# Patient Record
Sex: FEMALE | Race: WHITE | Employment: FULL TIME | URBAN - METROPOLITAN AREA
[De-identification: names, ages, dates, MRNs, and addresses within clinical notes are randomized per-mention and may not be internally consistent; named-entity substitution may affect disease eponyms.]

---

## 2017-01-03 ENCOUNTER — HOSPITAL ENCOUNTER (OUTPATIENT)
Age: 52
Discharge: HOME | End: 2017-01-03
Attending: EMERGENCY MEDICINE

## 2017-01-03 ENCOUNTER — APPOINTMENT (OUTPATIENT)
Dept: GENERAL RADIOLOGY | Age: 52
End: 2017-01-03
Attending: EMERGENCY MEDICINE

## 2017-01-03 VITALS
OXYGEN SATURATION: 100 % | RESPIRATION RATE: 16 BRPM | WEIGHT: 130 LBS | HEART RATE: 61 BPM | DIASTOLIC BLOOD PRESSURE: 65 MMHG | HEIGHT: 68 IN | TEMPERATURE: 98 F | BODY MASS INDEX: 19.7 KG/M2 | SYSTOLIC BLOOD PRESSURE: 115 MMHG

## 2017-01-03 DIAGNOSIS — M54.40 BACK PAIN OF LUMBAR REGION WITH SCIATICA: Primary | ICD-10-CM

## 2017-01-03 PROCEDURE — 99214 OFFICE O/P EST MOD 30 MIN: CPT

## 2017-01-03 PROCEDURE — 73502 X-RAY EXAM HIP UNI 2-3 VIEWS: CPT

## 2017-01-03 PROCEDURE — 72100 X-RAY EXAM L-S SPINE 2/3 VWS: CPT

## 2017-01-03 RX ORDER — LEVOTHYROXINE SODIUM 0.03 MG/1
25 TABLET ORAL
COMMUNITY
End: 2017-02-10 | Stop reason: DRUGHIGH

## 2017-01-03 RX ORDER — NAPROXEN 500 MG/1
500 TABLET ORAL 2 TIMES DAILY PRN
Qty: 14 TABLET | Refills: 0 | Status: SHIPPED | OUTPATIENT
Start: 2017-01-03 | End: 2017-01-10

## 2017-01-03 RX ORDER — METHYLPREDNISOLONE 4 MG/1
TABLET ORAL
Qty: 1 PACKAGE | Refills: 0 | Status: SHIPPED | OUTPATIENT
Start: 2017-01-03 | End: 2017-01-08

## 2017-01-03 RX ORDER — CYCLOBENZAPRINE HCL 10 MG
10 TABLET ORAL 3 TIMES DAILY PRN
Qty: 15 TABLET | Refills: 0 | Status: SHIPPED | OUTPATIENT
Start: 2017-01-03 | End: 2017-01-08

## 2017-01-03 NOTE — ED NOTES
Rounded on patient and provided her with comfort measures such as a blanket. Told patient about delay and patient was thankful for keeping her updated.

## 2017-01-03 NOTE — ED PROVIDER NOTES
Patient Seen in: [facility] Immediate Care In [address] [place] [address]    History   Patient presents with:  Lower Extremity Injury (musculoskeletal)    Stated Complaint: Right sided pain    HPI    76-year-old female patient presents complaining of right-sided hip Exam    Gen: Awake alert in no apparent distress  HEENT: Anicteric, EOMI, PERRL, clear oropharynx  Heart: Regular rate and rhythm, no murmur  Lungs: Normal respiratory effort, clear lungs  Abdomen: Soft,  nondistended, non tender  : No CVA tenderness  Sk for Muscle spasms.   Qty: 15 tablet Refills: 0

## 2017-01-03 NOTE — ED INITIAL ASSESSMENT (HPI)
Pain from lower right abdomen to lower back down to right foot. Denies any trauma. Denies nausea, vomiting and diarrhea. Gets worst at night when laying down. Taking advil and helps a little bit but wears off. Denies any fevers.

## 2017-01-19 ENCOUNTER — TELEPHONE (OUTPATIENT)
Dept: NEUROLOGY | Facility: CLINIC | Age: 52
End: 2017-01-19

## 2017-02-01 ENCOUNTER — OFFICE VISIT (OUTPATIENT)
Dept: PHYSICAL THERAPY | Facility: HOSPITAL | Age: 52
End: 2017-02-01
Attending: NEUROLOGICAL SURGERY
Payer: COMMERCIAL

## 2017-02-01 PROCEDURE — 97162 PT EVAL MOD COMPLEX 30 MIN: CPT

## 2017-02-01 PROCEDURE — 97110 THERAPEUTIC EXERCISES: CPT

## 2017-02-01 NOTE — PROGRESS NOTES
LUMBAR SPINE EVALUATION:   Referring Physician: Dr. Grecia Del Rio  Date of Onset: 12/25/2016 Date of Service: 2/1/2017   DX: Associated DX:  Herniated lumbar disc without myelopathy (M51.26)  PATIENT SUMMARY:   Randi Zabala is a 46year old y/o femal that radiate to the posterior thigh and distally to the lateral lower leg to the outside ot the R foot since 12/25/2016.   Pt exhibits slow, antalgic gait with decreased WB of the RLE, significant lateral shift of the trunk, decreased ROM and flexibility, a modified or discontinued. Patient was instructed in and issued a HEP for prone lying followed by \"road kill position\", to be done every two hours.    Charges: PT Eval x1; TE x1;      Total Timed treatment: 45 min      Total Treatment Time: 47 min while under my care.     X______________________________________________ Date________________  Certification From: 8/1/3403      To: 5/2/2017

## 2017-02-03 ENCOUNTER — OFFICE VISIT (OUTPATIENT)
Dept: PHYSICAL THERAPY | Facility: HOSPITAL | Age: 52
End: 2017-02-03
Attending: NEUROLOGICAL SURGERY
Payer: COMMERCIAL

## 2017-02-03 PROCEDURE — 97110 THERAPEUTIC EXERCISES: CPT | Performed by: PHYSICAL THERAPIST

## 2017-02-03 NOTE — PROGRESS NOTES
Dx: Herniated lumbar disc without myelopathy (M51.26)         Visit # 2           Scheduled Visits 8            Insurance Authorized visits 12(POC)       Next MD visit: none scheduled  Fall Risk: standard         Precautions: n/a           Medication Ana Charter

## 2017-02-07 ENCOUNTER — OFFICE VISIT (OUTPATIENT)
Dept: PHYSICAL THERAPY | Facility: HOSPITAL | Age: 52
End: 2017-02-07
Attending: NEUROLOGICAL SURGERY
Payer: COMMERCIAL

## 2017-02-07 PROCEDURE — 97110 THERAPEUTIC EXERCISES: CPT | Performed by: PHYSICAL THERAPIST

## 2017-02-07 NOTE — PROGRESS NOTES
Dx: Herniated lumbar disc without myelopathy (M51.26)         Visit # 3           Scheduled Visits 8            Insurance Authorized visits 12(POC)       Next MD visit: none scheduled  Fall Risk: standard         Precautions: n/a           Medication Tiana Irwin

## 2017-02-09 ENCOUNTER — TELEPHONE (OUTPATIENT)
Dept: NEUROLOGY | Facility: CLINIC | Age: 52
End: 2017-02-09

## 2017-02-09 ENCOUNTER — OFFICE VISIT (OUTPATIENT)
Dept: PHYSICAL THERAPY | Facility: HOSPITAL | Age: 52
End: 2017-02-09
Attending: NEUROLOGICAL SURGERY
Payer: COMMERCIAL

## 2017-02-09 PROCEDURE — 97110 THERAPEUTIC EXERCISES: CPT | Performed by: PHYSICAL THERAPIST

## 2017-02-09 NOTE — PROGRESS NOTES
Dx: Herniated lumbar disc without myelopathy (M51.26)         Visit # 4           Scheduled Visits 8            Insurance Authorized visits 12(POC)       Next MD visit: none scheduled  Fall Risk: standard         Precautions: n/a           Medication Freddy Phipps

## 2017-02-10 ENCOUNTER — TELEPHONE (OUTPATIENT)
Dept: NEUROLOGY | Facility: CLINIC | Age: 52
End: 2017-02-10

## 2017-02-10 ENCOUNTER — OFFICE VISIT (OUTPATIENT)
Dept: NEUROLOGY | Facility: CLINIC | Age: 52
End: 2017-02-10

## 2017-02-10 VITALS
HEIGHT: 67 IN | WEIGHT: 130 LBS | HEART RATE: 64 BPM | RESPIRATION RATE: 16 BRPM | BODY MASS INDEX: 20.4 KG/M2 | DIASTOLIC BLOOD PRESSURE: 78 MMHG | SYSTOLIC BLOOD PRESSURE: 112 MMHG

## 2017-02-10 DIAGNOSIS — M16.0 PRIMARY OSTEOARTHRITIS OF BOTH HIPS: ICD-10-CM

## 2017-02-10 DIAGNOSIS — M47.816 LUMBAR FACET ARTHROPATHY: ICD-10-CM

## 2017-02-10 DIAGNOSIS — M21.70 LEG LENGTH DISCREPANCY: ICD-10-CM

## 2017-02-10 DIAGNOSIS — M95.5 PELVIC OBLIQUITY: ICD-10-CM

## 2017-02-10 DIAGNOSIS — M54.16 LUMBAR RADICULOPATHY: ICD-10-CM

## 2017-02-10 DIAGNOSIS — M48.061 LUMBAR STENOSIS: Primary | ICD-10-CM

## 2017-02-10 DIAGNOSIS — M41.25 OTHER IDIOPATHIC SCOLIOSIS, THORACOLUMBAR REGION: ICD-10-CM

## 2017-02-10 PROCEDURE — 99204 OFFICE O/P NEW MOD 45 MIN: CPT | Performed by: PHYSICAL MEDICINE & REHABILITATION

## 2017-02-10 RX ORDER — METHYLPREDNISOLONE 4 MG/1
TABLET ORAL
Qty: 1 PACKAGE | Refills: 0 | Status: ON HOLD | OUTPATIENT
Start: 2017-02-10 | End: 2019-12-04 | Stop reason: ALTCHOICE

## 2017-02-10 RX ORDER — IBUPROFEN 200 MG
200 TABLET ORAL EVERY 6 HOURS PRN
COMMUNITY
End: 2020-05-20 | Stop reason: ALTCHOICE

## 2017-02-10 RX ORDER — LEVOTHYROXINE SODIUM 0.12 MG/1
1 TABLET ORAL DAILY
Refills: 0 | COMMUNITY
Start: 2017-01-05 | End: 2020-05-29

## 2017-02-10 RX ORDER — GABAPENTIN 100 MG/1
100 CAPSULE ORAL 3 TIMES DAILY
Qty: 90 CAPSULE | Refills: 0 | Status: ON HOLD | OUTPATIENT
Start: 2017-02-10 | End: 2019-12-04 | Stop reason: ALTCHOICE

## 2017-02-10 RX ORDER — BUPRENORPHINE HCL 8 MG/1
1 TABLET SUBLINGUAL DAILY
COMMUNITY

## 2017-02-10 RX ORDER — TRAMADOL HYDROCHLORIDE 50 MG/1
TABLET ORAL
Refills: 0 | Status: ON HOLD | COMMUNITY
Start: 2017-01-23 | End: 2019-12-04 | Stop reason: ALTCHOICE

## 2017-02-10 NOTE — PATIENT INSTRUCTIONS
- continue physical therapy  - take steroid pills - medrol dosepak - one week. Do not take additional advil aleve or aspirin with steroids. Anti-inflammatory  - take gabapentin 100mg three times a day - nerve medicine.  First dose at night.  - Consider the a refill, you may be due for a follow up appointment. · To best provide you care, patients receiving routine medications need to be seen at least once a year.        protocol for controlled substances: Written prescriptions  · Written prescriptions

## 2017-02-10 NOTE — TELEPHONE ENCOUNTER
Called [facility] [phone] for Authorization of Approval for injection procedure Rt L4 & Rt L5 CPT code 59599 / 49655 t/t Adri GAMEZ, stated No Auth needed with Ref # [id number], will call patient to inform of the Approval. L/M for patient to return call to schedul

## 2017-02-10 NOTE — PROGRESS NOTES
History of Present Illness:   Patient presents with:  Low Back Pain: New patient referred by Dr. Shea Gould for low back and right leg pain, started 12/25/16. MRI done at outside facility. Started physical therapy, with little improvement so far.    47 yo woman mouth daily. Disp:  Rfl: 0   TraMADol HCl 50 MG Oral Tab TK 1 T PO Q 6 H PRN P Disp:  Rfl: 0   ibuprofen (ADVIL) 200 MG Oral Tab Take 200 mg by mouth every 6 (six) hours as needed for Pain.  Disp:  Rfl:    Multiple Vitamins-Minerals (CENTRUM SILVER) Oral Ta apparent distress. HEENT: Without scleral icterus. Pupils are equal and round. No discharge markell eyes, nares, ears. Lymphatic:  No femoral nodes or masses markell. Lungs: no acute respiratory distress. Abdomen: soft, nontender, tender right inguinal area. and lumbar stenosis with scoliosis. Reviewed mri on cd - returned to patient. Reports received at end of visit and copy given to patient. Discussed both creating pain but in different distributions. 2. Medrol dosepak x 1 week. Hold off on ibuprofen.    3.

## 2017-02-13 PROBLEM — M41.25 IDIOPATHIC SCOLIOSIS OF THORACOLUMBAR SPINE: Status: ACTIVE | Noted: 2017-02-13

## 2017-02-13 PROBLEM — M48.061 LUMBAR STENOSIS: Status: ACTIVE | Noted: 2017-02-13

## 2017-02-13 PROBLEM — M16.0 PRIMARY OSTEOARTHRITIS OF BOTH HIPS: Status: ACTIVE | Noted: 2017-02-13

## 2017-02-14 ENCOUNTER — OFFICE VISIT (OUTPATIENT)
Dept: PHYSICAL THERAPY | Facility: HOSPITAL | Age: 52
End: 2017-02-14
Attending: NEUROLOGICAL SURGERY
Payer: COMMERCIAL

## 2017-02-14 PROCEDURE — 97110 THERAPEUTIC EXERCISES: CPT | Performed by: PHYSICAL THERAPIST

## 2017-02-14 NOTE — PROGRESS NOTES
Dx: Herniated lumbar disc without myelopathy (M51.26)         Visit # 5           Scheduled Visits 9            Insurance Authorized visits 12(POC)       Next MD visit: none scheduled  Fall Risk: standard         Precautions: n/a           Medication Rebeca Mcmanus

## 2017-02-16 ENCOUNTER — OFFICE VISIT (OUTPATIENT)
Dept: PHYSICAL THERAPY | Facility: HOSPITAL | Age: 52
End: 2017-02-16
Payer: COMMERCIAL

## 2017-02-16 PROCEDURE — 97110 THERAPEUTIC EXERCISES: CPT | Performed by: PHYSICAL THERAPIST

## 2017-02-16 NOTE — PROGRESS NOTES
Dx: Herniated lumbar disc without myelopathy (M51.26)         Visit # 6           Scheduled Visits 9            Insurance Authorized visits 17(POC)        Next MD visit: none scheduled  Fall Risk: standard         Precautions: n/a           Medication Celina Kenyon

## 2017-02-21 ENCOUNTER — OFFICE VISIT (OUTPATIENT)
Dept: PHYSICAL THERAPY | Facility: HOSPITAL | Age: 52
End: 2017-02-21
Attending: NEUROLOGICAL SURGERY
Payer: COMMERCIAL

## 2017-02-21 PROCEDURE — 97110 THERAPEUTIC EXERCISES: CPT | Performed by: PHYSICAL THERAPIST

## 2017-02-21 NOTE — PROGRESS NOTES
Dx: Herniated lumbar disc without myelopathy (M51.26)         Visit # 6           Scheduled Visits 9            Insurance Authorized visits 17(POC)        Next MD visit: none scheduled  Fall Risk: standard         Precautions: n/a           Medication Sonia Mills

## 2017-02-21 NOTE — TELEPHONE ENCOUNTER
Patient will be scheduled for a right L4 + L5 transforaminal epidural steroid injection on 3/3/17. Medications and allergies reviewed. Patient informed to hold aspirins, nsaids, blood thinners, vitamins and fish oils 7 days prior to procedure.   Patient in

## 2017-02-23 ENCOUNTER — OFFICE VISIT (OUTPATIENT)
Dept: PHYSICAL THERAPY | Facility: HOSPITAL | Age: 52
End: 2017-02-23
Attending: NEUROLOGICAL SURGERY
Payer: COMMERCIAL

## 2017-02-23 PROCEDURE — 97110 THERAPEUTIC EXERCISES: CPT | Performed by: PHYSICAL THERAPIST

## 2017-02-23 NOTE — PROGRESS NOTES
Dx: Herniated lumbar disc without myelopathy (M51.26)         Visit # 7           Scheduled Visits 9            Insurance Authorized visits 17(POC)       Next MD visit: none scheduled, Injection 3/3/17  Fall Risk: standard         Precautions: n/a

## 2017-02-28 ENCOUNTER — OFFICE VISIT (OUTPATIENT)
Dept: PHYSICAL THERAPY | Facility: HOSPITAL | Age: 52
End: 2017-02-28
Attending: NEUROLOGICAL SURGERY
Payer: COMMERCIAL

## 2017-02-28 PROCEDURE — 97110 THERAPEUTIC EXERCISES: CPT | Performed by: PHYSICAL THERAPIST

## 2017-02-28 NOTE — PROGRESS NOTES
Dx: Herniated lumbar disc without myelopathy (M51.26)         Visit # 8           Scheduled Visits 12            Insurance Authorized visits 17(POC)       Next MD visit: none scheduled, Injection 3/3/17  Fall Risk: standard         Precautions: n/a

## 2017-03-02 ENCOUNTER — APPOINTMENT (OUTPATIENT)
Dept: PHYSICAL THERAPY | Facility: HOSPITAL | Age: 52
End: 2017-03-02
Attending: NEUROLOGICAL SURGERY
Payer: COMMERCIAL

## 2017-03-03 ENCOUNTER — MED REC SCAN ONLY (OUTPATIENT)
Dept: NEUROLOGY | Facility: CLINIC | Age: 52
End: 2017-03-03

## 2017-03-03 ENCOUNTER — OFFICE VISIT (OUTPATIENT)
Dept: SURGERY | Facility: CLINIC | Age: 52
End: 2017-03-03

## 2017-03-03 DIAGNOSIS — M54.16 LUMBAR RADICULOPATHY: ICD-10-CM

## 2017-03-03 DIAGNOSIS — M48.061 LUMBAR STENOSIS: Primary | ICD-10-CM

## 2017-03-03 PROCEDURE — 64484 NJX AA&/STRD TFRM EPI L/S EA: CPT | Performed by: PHYSICAL MEDICINE & REHABILITATION

## 2017-03-03 PROCEDURE — 64483 NJX AA&/STRD TFRM EPI L/S 1: CPT | Performed by: PHYSICAL MEDICINE & REHABILITATION

## 2017-03-04 NOTE — PROGRESS NOTES
Dict # [id number] right L4 + L5 tfesi. Local. Informed if right groin is still bothering her, she can make fu and she can consider right hip joint steroid injection. She had not made fu yet.  -nicolas

## 2017-03-06 ENCOUNTER — TELEPHONE (OUTPATIENT)
Dept: NEUROLOGY | Facility: CLINIC | Age: 52
End: 2017-03-06

## 2017-03-07 ENCOUNTER — APPOINTMENT (OUTPATIENT)
Dept: PHYSICAL THERAPY | Facility: HOSPITAL | Age: 52
End: 2017-03-07
Attending: NEUROLOGICAL SURGERY
Payer: COMMERCIAL

## 2017-03-09 ENCOUNTER — APPOINTMENT (OUTPATIENT)
Dept: PHYSICAL THERAPY | Facility: HOSPITAL | Age: 52
End: 2017-03-09
Attending: NEUROLOGICAL SURGERY
Payer: COMMERCIAL

## 2017-03-14 ENCOUNTER — APPOINTMENT (OUTPATIENT)
Dept: PHYSICAL THERAPY | Facility: HOSPITAL | Age: 52
End: 2017-03-14
Attending: NEUROLOGICAL SURGERY
Payer: COMMERCIAL

## 2017-03-16 ENCOUNTER — APPOINTMENT (OUTPATIENT)
Dept: PHYSICAL THERAPY | Facility: HOSPITAL | Age: 52
End: 2017-03-16
Attending: NEUROLOGICAL SURGERY
Payer: COMMERCIAL

## 2017-03-21 ENCOUNTER — APPOINTMENT (OUTPATIENT)
Dept: PHYSICAL THERAPY | Facility: HOSPITAL | Age: 52
End: 2017-03-21
Attending: NEUROLOGICAL SURGERY
Payer: COMMERCIAL

## 2017-03-23 ENCOUNTER — APPOINTMENT (OUTPATIENT)
Dept: PHYSICAL THERAPY | Facility: HOSPITAL | Age: 52
End: 2017-03-23
Attending: NEUROLOGICAL SURGERY
Payer: COMMERCIAL

## 2017-03-28 ENCOUNTER — APPOINTMENT (OUTPATIENT)
Dept: PHYSICAL THERAPY | Facility: HOSPITAL | Age: 52
End: 2017-03-28
Attending: NEUROLOGICAL SURGERY
Payer: COMMERCIAL

## 2017-03-29 NOTE — PROGRESS NOTES
Patient Name: Citlaly Childers, : 1965, MRN: [id number]   Date:  3/29/2017  Referring Physician:  aJson Moss    Diagnosis: Herniated lumbar disc without myelopathy (M51.26)          Discharge Summary    Pt has attended 8 visits in Physical

## 2018-07-09 ENCOUNTER — HOSPITAL ENCOUNTER (OUTPATIENT)
Age: 53
Discharge: HOME | End: 2018-07-09
Attending: EMERGENCY MEDICINE
Payer: COMMERCIAL

## 2018-07-09 ENCOUNTER — APPOINTMENT (OUTPATIENT)
Dept: GENERAL RADIOLOGY | Age: 53
End: 2018-07-09
Attending: EMERGENCY MEDICINE
Payer: COMMERCIAL

## 2018-07-09 VITALS
RESPIRATION RATE: 16 BRPM | TEMPERATURE: 98 F | DIASTOLIC BLOOD PRESSURE: 74 MMHG | SYSTOLIC BLOOD PRESSURE: 123 MMHG | HEART RATE: 70 BPM

## 2018-07-09 DIAGNOSIS — S49.92XA INJURY OF LEFT ACROMIOCLAVICULAR JOINT, INITIAL ENCOUNTER: Primary | ICD-10-CM

## 2018-07-09 PROCEDURE — 99213 OFFICE O/P EST LOW 20 MIN: CPT

## 2018-07-09 PROCEDURE — 73030 X-RAY EXAM OF SHOULDER: CPT | Performed by: EMERGENCY MEDICINE

## 2018-07-09 NOTE — ED PROVIDER NOTES
Patient Seen in: [facility] Immediate Care In [address] [place] [address]    History   Patient presents with:  Upper Extremity Injury (musculoskeletal)    Stated Complaint: post fall    HPI    Patient is a 49-year-old female that complains of a left shoulder injury. 1932  ------------------------------------------------------------      [place]                   Disposition and Plan     Clinical Impression:  Injury of left acromioclavicular joint, initial encounter  (primary encounter diagnosis)    Disposition:  Discharge

## 2019-12-04 ENCOUNTER — APPOINTMENT (OUTPATIENT)
Dept: GENERAL RADIOLOGY | Facility: HOSPITAL | Age: 54
End: 2019-12-04
Attending: EMERGENCY MEDICINE
Payer: COMMERCIAL

## 2019-12-04 ENCOUNTER — HOSPITAL ENCOUNTER (OUTPATIENT)
Facility: HOSPITAL | Age: 54
Setting detail: OBSERVATION
Discharge: HOME | End: 2019-12-06
Attending: EMERGENCY MEDICINE | Admitting: INTERNAL MEDICINE
Payer: COMMERCIAL

## 2019-12-04 DIAGNOSIS — S93.05XA ANKLE DISLOCATION, LEFT, INITIAL ENCOUNTER: ICD-10-CM

## 2019-12-04 DIAGNOSIS — S82.842A BIMALLEOLAR FRACTURE OF LEFT ANKLE, CLOSED, INITIAL ENCOUNTER: Primary | ICD-10-CM

## 2019-12-04 PROBLEM — E03.9 HYPOTHYROIDISM: Chronic | Status: ACTIVE | Noted: 2019-12-04

## 2019-12-04 PROCEDURE — 73610 X-RAY EXAM OF ANKLE: CPT | Performed by: EMERGENCY MEDICINE

## 2019-12-04 PROCEDURE — 96376 TX/PRO/DX INJ SAME DRUG ADON: CPT

## 2019-12-04 PROCEDURE — 96375 TX/PRO/DX INJ NEW DRUG ADDON: CPT

## 2019-12-04 PROCEDURE — 96374 THER/PROPH/DIAG INJ IV PUSH: CPT

## 2019-12-04 PROCEDURE — 86900 BLOOD TYPING SEROLOGIC ABO: CPT | Performed by: EMERGENCY MEDICINE

## 2019-12-04 PROCEDURE — 80048 BASIC METABOLIC PNL TOTAL CA: CPT | Performed by: EMERGENCY MEDICINE

## 2019-12-04 PROCEDURE — 86850 RBC ANTIBODY SCREEN: CPT | Performed by: EMERGENCY MEDICINE

## 2019-12-04 PROCEDURE — 86901 BLOOD TYPING SEROLOGIC RH(D): CPT | Performed by: EMERGENCY MEDICINE

## 2019-12-04 PROCEDURE — 27810 TREATMENT OF ANKLE FRACTURE: CPT

## 2019-12-04 PROCEDURE — 85025 COMPLETE CBC W/AUTO DIFF WBC: CPT | Performed by: EMERGENCY MEDICINE

## 2019-12-04 PROCEDURE — 96372 THER/PROPH/DIAG INJ SC/IM: CPT

## 2019-12-04 PROCEDURE — 99285 EMERGENCY DEPT VISIT HI MDM: CPT

## 2019-12-04 RX ORDER — POLYETHYLENE GLYCOL 3350 17 G/17G
17 POWDER, FOR SOLUTION ORAL DAILY PRN
Status: DISCONTINUED | OUTPATIENT
Start: 2019-12-04 | End: 2019-12-06

## 2019-12-04 RX ORDER — MORPHINE SULFATE 4 MG/ML
INJECTION, SOLUTION INTRAMUSCULAR; INTRAVENOUS
Status: COMPLETED
Start: 2019-12-04 | End: 2019-12-04

## 2019-12-04 RX ORDER — ACETAMINOPHEN 325 MG/1
650 TABLET ORAL EVERY 4 HOURS PRN
Status: DISCONTINUED | OUTPATIENT
Start: 2019-12-04 | End: 2019-12-06

## 2019-12-04 RX ORDER — SODIUM PHOSPHATE, DIBASIC AND SODIUM PHOSPHATE, MONOBASIC 7; 19 G/133ML; G/133ML
1 ENEMA RECTAL ONCE AS NEEDED
Status: DISCONTINUED | OUTPATIENT
Start: 2019-12-04 | End: 2019-12-06

## 2019-12-04 RX ORDER — BISACODYL 10 MG
10 SUPPOSITORY, RECTAL RECTAL
Status: DISCONTINUED | OUTPATIENT
Start: 2019-12-04 | End: 2019-12-06

## 2019-12-04 RX ORDER — IBUPROFEN 200 MG
200 TABLET ORAL EVERY 6 HOURS PRN
Status: DISCONTINUED | OUTPATIENT
Start: 2019-12-04 | End: 2019-12-04

## 2019-12-04 RX ORDER — IBUPROFEN 600 MG/1
600 TABLET ORAL ONCE
Status: COMPLETED | OUTPATIENT
Start: 2019-12-04 | End: 2019-12-04

## 2019-12-04 RX ORDER — SODIUM CHLORIDE 0.9 % (FLUSH) 0.9 %
3 SYRINGE (ML) INJECTION AS NEEDED
Status: DISCONTINUED | OUTPATIENT
Start: 2019-12-04 | End: 2019-12-06

## 2019-12-04 RX ORDER — HEPARIN SODIUM 5000 [USP'U]/ML
5000 INJECTION, SOLUTION INTRAVENOUS; SUBCUTANEOUS EVERY 12 HOURS SCHEDULED
Status: DISCONTINUED | OUTPATIENT
Start: 2019-12-04 | End: 2019-12-05

## 2019-12-04 RX ORDER — ETOMIDATE 2 MG/ML
0.1 INJECTION INTRAVENOUS AS NEEDED
Status: DISCONTINUED | OUTPATIENT
Start: 2019-12-04 | End: 2019-12-04 | Stop reason: ALTCHOICE

## 2019-12-04 RX ORDER — DOCUSATE SODIUM 100 MG/1
100 CAPSULE, LIQUID FILLED ORAL 2 TIMES DAILY
Status: DISCONTINUED | OUTPATIENT
Start: 2019-12-04 | End: 2019-12-06

## 2019-12-04 RX ORDER — METOCLOPRAMIDE HYDROCHLORIDE 5 MG/ML
10 INJECTION INTRAMUSCULAR; INTRAVENOUS EVERY 8 HOURS PRN
Status: DISCONTINUED | OUTPATIENT
Start: 2019-12-04 | End: 2019-12-06

## 2019-12-04 RX ORDER — HYDROCODONE BITARTRATE AND ACETAMINOPHEN 5; 325 MG/1; MG/1
1 TABLET ORAL EVERY 4 HOURS PRN
Status: DISCONTINUED | OUTPATIENT
Start: 2019-12-04 | End: 2019-12-06

## 2019-12-04 RX ORDER — CELECOXIB 200 MG/1
200 CAPSULE ORAL EVERY 24 HOURS
Status: DISCONTINUED | OUTPATIENT
Start: 2019-12-04 | End: 2019-12-06

## 2019-12-04 RX ORDER — LEVOTHYROXINE SODIUM 0.12 MG/1
125 TABLET ORAL DAILY
Status: DISCONTINUED | OUTPATIENT
Start: 2019-12-04 | End: 2019-12-04

## 2019-12-04 RX ORDER — OXYCODONE HYDROCHLORIDE AND ACETAMINOPHEN 5; 325 MG/1; MG/1
1 TABLET ORAL EVERY 6 HOURS PRN
Status: DISCONTINUED | OUTPATIENT
Start: 2019-12-04 | End: 2019-12-04 | Stop reason: ALTCHOICE

## 2019-12-04 RX ORDER — HYDROCODONE BITARTRATE AND ACETAMINOPHEN 5; 325 MG/1; MG/1
2 TABLET ORAL EVERY 4 HOURS PRN
Status: DISCONTINUED | OUTPATIENT
Start: 2019-12-04 | End: 2019-12-06

## 2019-12-04 RX ORDER — MORPHINE SULFATE 4 MG/ML
2 INJECTION, SOLUTION INTRAMUSCULAR; INTRAVENOUS ONCE
Status: COMPLETED | OUTPATIENT
Start: 2019-12-04 | End: 2019-12-04

## 2019-12-04 RX ORDER — OXYCODONE AND ACETAMINOPHEN 10; 325 MG/1; MG/1
1 TABLET ORAL EVERY 4 HOURS PRN
Status: DISCONTINUED | OUTPATIENT
Start: 2019-12-04 | End: 2019-12-04 | Stop reason: ALTCHOICE

## 2019-12-04 RX ORDER — CALCIUM CARBONATE/VITAMIN D3 250-3.125
1 TABLET ORAL DAILY
Status: DISCONTINUED | OUTPATIENT
Start: 2019-12-05 | End: 2019-12-06

## 2019-12-04 RX ORDER — MORPHINE SULFATE 4 MG/ML
4 INJECTION, SOLUTION INTRAMUSCULAR; INTRAVENOUS ONCE
Status: COMPLETED | OUTPATIENT
Start: 2019-12-04 | End: 2019-12-04

## 2019-12-04 RX ORDER — HEPARIN SODIUM 5000 [USP'U]/ML
5000 INJECTION, SOLUTION INTRAVENOUS; SUBCUTANEOUS EVERY 12 HOURS SCHEDULED
Status: DISCONTINUED | OUTPATIENT
Start: 2019-12-04 | End: 2019-12-04

## 2019-12-04 RX ORDER — ETOMIDATE 2 MG/ML
INJECTION INTRAVENOUS
Status: DISPENSED
Start: 2019-12-04 | End: 2019-12-04

## 2019-12-04 RX ORDER — ONDANSETRON 2 MG/ML
4 INJECTION INTRAMUSCULAR; INTRAVENOUS EVERY 6 HOURS PRN
Status: DISCONTINUED | OUTPATIENT
Start: 2019-12-04 | End: 2019-12-06

## 2019-12-04 NOTE — ED NOTES
62year old female here with L ankle pain after dog pulled her into mailbox and patient fell   +deformity + swelling, + pulses, CMS intact, pt reports happen this am while walking her [place] arreguin , Ice pack in place, await Xray
Attempted to ambulate pt with crutches but pt unable to tolerate movement of the left ankle. ER MD Moreno aware.
Endorsed to [facility], pt aware of plan, awaiting bed assignment, family at bedside
Pt awake alert , talking to this RN, back to baseline, VSS, will continue to monitor closely
Pt sitting up talking to nurse still drowsy but arousable, will continue to monitor closely
Report given to JOSE ANTONIO CIFUENTES [facility].
No

## 2019-12-04 NOTE — CONSULTS
[facility]    Report of Consultation    Ritika Pozo Patient Status:  Observation    1965 MRN [id number]   Location [address] [facility] Attending Jackelyn Brock MD   Hosp Day # 0 PCP None Pcp     Date of Admission:  2019  Date mL, Intravenous, PRN  Heparin Sodium (Porcine) 5000 UNIT/ML injection 5,000 Units, 5,000 Units, Subcutaneous, 2 times per day  acetaminophen (TYLENOL) tab 650 mg, 650 mg, Oral, Q4H PRN    Or  HYDROcodone-acetaminophen (NORCO) 5-325 MG per tab 1 tablet, 1 t WBC 7.4 12/04/2019    HGB 12.7 12/04/2019    HCT 38.3 12/04/2019    .0 12/04/2019    CREATSERUM 0.72 12/04/2019    BUN 9 12/04/2019     12/04/2019    K 3.9 12/04/2019     12/04/2019    CO2 26.0 12/04/2019    GLU 96 12/04/2019    CA 9. allowing me to participate in the care of your patient.     Mauricio Jama  12/4/2019

## 2019-12-04 NOTE — ED PROVIDER NOTES
Patient Seen in: [facility] Emergency Department      History   Patient presents with:  Lower Extremity Injury    Stated Complaint: ankle injury     HPI    Patient is a 78-year-old female who presents with left ankle injury that occurred earlier t Absolute 0.77 (*)     All other components within normal limits   BASIC METABOLIC PANEL (8) - Normal   CBC WITH DIFFERENTIAL WITH PLATELET    Narrative: The following orders were created for panel order CBC WITH DIFFERENTIAL WITH PLATELET.   Procedure Xr Ankle (min 3 Views), Left (cpt=73610)    Result Date: 12/4/2019  CONCLUSION:   Complete tibiotalar dislocation with lateral displacement of the talar dome in relation to the tibia.   Complete fracture of the medial malleolus which is displaced inferi complications. Repeat xray showed better alignment. Pt has seen Dr. Cora Lazaro in past for ankle sprain.  He is not on call and requests consultation with on-call orthopedist.   D/w dr Komal Pagan PA-recommends home with splint, narcotic pain meds and f/u

## 2019-12-04 NOTE — H&P
[address] Patient Status:  Emergency    1965 MRN [id number]   Location [address] Attending Freddie Hong MD   Hosp Day # 0 PCP None Pcp     Date:  2019 change in bowel habits, constipation, diarrhea and vomiting.   Musculoskeletal: + pain left ankle  Neurological: negative for dizziness, headaches, tremors and weakness  Endocrine: negative  Allergic/Immunologic: negative    Physical Exam:  /73   Puls

## 2019-12-05 PROCEDURE — 85027 COMPLETE CBC AUTOMATED: CPT | Performed by: INTERNAL MEDICINE

## 2019-12-05 PROCEDURE — 84132 ASSAY OF SERUM POTASSIUM: CPT | Performed by: INTERNAL MEDICINE

## 2019-12-05 PROCEDURE — 80048 BASIC METABOLIC PNL TOTAL CA: CPT | Performed by: INTERNAL MEDICINE

## 2019-12-05 PROCEDURE — 96372 THER/PROPH/DIAG INJ SC/IM: CPT

## 2019-12-05 RX ORDER — POTASSIUM CHLORIDE 20 MEQ/1
40 TABLET, EXTENDED RELEASE ORAL EVERY 4 HOURS
Status: COMPLETED | OUTPATIENT
Start: 2019-12-05 | End: 2019-12-05

## 2019-12-05 RX ORDER — POTASSIUM CHLORIDE 20 MEQ/1
40 TABLET, EXTENDED RELEASE ORAL ONCE
Status: COMPLETED | OUTPATIENT
Start: 2019-12-05 | End: 2019-12-05

## 2019-12-05 RX ORDER — HEPARIN SODIUM 5000 [USP'U]/ML
5000 INJECTION, SOLUTION INTRAVENOUS; SUBCUTANEOUS EVERY 12 HOURS SCHEDULED
Status: COMPLETED | OUTPATIENT
Start: 2019-12-05 | End: 2019-12-05

## 2019-12-05 NOTE — PROGRESS NOTES
[facility]    Progress Note    Trinh Dior Patient Status:  Observation    1965 MRN [id number]   Location [facility] 1W Attending René Delvalle MD   Hosp Day # 0 PCP None Pcp     SUBJECTIVE:  Pt seen and examined at bedside. (DULCOLAX) rectal suppository 10 mg, 10 mg, Rectal, Daily PRN  FLEET ENEMA (FLEET) 7-19 GM/118ML enema 133 mL, 1 enema, Rectal, Once PRN  ondansetron HCl (ZOFRAN) injection 4 mg, 4 mg, Intravenous, Q6H PRN  Metoclopramide HCl (REGLAN) injection 10 mg, 10 m

## 2019-12-05 NOTE — PLAN OF CARE
Problem: Patient Centered Care  Goal: Patient preferences are identified and integrated in the patient's plan of care  Description  Interventions:  - What would you like us to know as we care for you? Lives at home.   - Provide timely, complete, and accur fall injury  Description  INTERVENTIONS:  - Assess pt frequently for physical needs  - Identify cognitive and physical deficits and behaviors that affect risk of falls.   - [place] fall precautions as indicated by assessment.  - Educate pt/family on patie Communicate ordered activity level and limitations with patient/family  Outcome: Progressing  Goal: Maintain proper alignment of affected body part  Description  INTERVENTIONS:  - Support and protect limb and body alignment per provider's orders  - Instruc

## 2019-12-05 NOTE — PLAN OF CARE
Patient up to bathroom with wheelchair and 1 assist. Patient still complaint of severe pain with movement. Patient is scheduled for surgery tomorrow by Dr. Clarissa Vallecillo. To have clear liquid breakfast and NPO at 1000. Will continue to monitor pain.   Problem: Patient opioid side effects  - Notify MD/LIP if interventions unsuccessful or patient reports new pain  - Anticipate increased pain with activity and pre-medicate as appropriate  Outcome: Progressing     Problem: SAFETY ADULT - FALL  Goal: Free from fall injury  D devices  - Assist with transfers and ambulation using safe patient handling equipment as needed  - Ensure adequate protection for wounds/incisions during mobilization  - Obtain PT/OT consults as needed  - Advance activity as appropriate  - Communicate orde

## 2019-12-05 NOTE — PHYSICAL THERAPY NOTE
Pt order received and chart reviewed. Spoke to nursing and pt, possible sx this Friday? If so, will hold PT eval until after the ORIF on the L LE. Pt has a Bimalleolar fracture of left ankle. Please re-order PT after L ankle sx.  Nursing is aware

## 2019-12-06 VITALS
RESPIRATION RATE: 18 BRPM | OXYGEN SATURATION: 95 % | TEMPERATURE: 97 F | SYSTOLIC BLOOD PRESSURE: 126 MMHG | HEART RATE: 72 BPM | HEIGHT: 68 IN | WEIGHT: 132.5 LBS | DIASTOLIC BLOOD PRESSURE: 73 MMHG | BODY MASS INDEX: 20.08 KG/M2

## 2019-12-06 PROCEDURE — 84132 ASSAY OF SERUM POTASSIUM: CPT | Performed by: INTERNAL MEDICINE

## 2019-12-06 NOTE — DISCHARGE SUMMARY
[facility]    Discharge Summary    Toy Marts Patient Status:  Observation    1965 MRN [id number]   Location [facility] 1W Attending No att. providers found   Hosp Day # 0 PCP None Pcp     Date of Admission: 2019 Akil Orozco vomiting. Been NPO  No other injuries    Hospital Course:   1. Bimalleolar fracture of left ankle  2.  Hypothyroid     Plan:     1. Pain control- norco prn   2. ortho consult - kimberly Ruiz yesterday and plan for pt to get discharged and will go straight to his

## 2019-12-06 NOTE — PLAN OF CARE
Sister picking up patient, and bringing her directly to the surgical center. Transfer via w/c.      Problem: Patient Centered Care  Goal: Patient preferences are identified and integrated in the patient's plan of care  Description  Interventions:  - What wo increased pain with activity and pre-medicate as appropriate  Outcome: Adequate for Discharge     Problem: SAFETY ADULT - FALL  Goal: Free from fall injury  Description  INTERVENTIONS:  - Assess pt frequently for physical needs  - Identify cognitive and ph equipment as needed  - Ensure adequate protection for wounds/incisions during mobilization  - Obtain PT/OT consults as needed  - Advance activity as appropriate  - Communicate ordered activity level and limitations with patient/family  Outcome: Adequate fo

## 2019-12-06 NOTE — PROGRESS NOTES
[facility]    Progress Note    Edmar Mitshreya Patient Status:  Observation    1965 MRN [id number]   Location [facility] 1W Attending Nicholas Winn MD   Hosp Day # 0 PCP None Pcp     SUBJECTIVE:  Pt seen and examined at bedside. Sodium tab 125 mcg, 125 mcg, Oral, Daily  celecoxib (CeleBREX) cap 200 mg, 200 mg, Oral, Q24H        Assessment:     1. Bimalleolar fracture of left ankle  2.  Hypothyroid     Plan:     1. Pain control- norco prn   2. ortho consult - kimberly Cox yesterday and

## 2020-05-20 ENCOUNTER — OFFICE VISIT (OUTPATIENT)
Dept: FAMILY MEDICINE CLINIC | Facility: CLINIC | Age: 55
End: 2020-05-20
Payer: COMMERCIAL

## 2020-05-20 VITALS
WEIGHT: 134 LBS | HEIGHT: 68 IN | HEART RATE: 65 BPM | BODY MASS INDEX: 20.31 KG/M2 | DIASTOLIC BLOOD PRESSURE: 69 MMHG | SYSTOLIC BLOOD PRESSURE: 103 MMHG

## 2020-05-20 DIAGNOSIS — Z01.419 ROUTINE GYNECOLOGICAL EXAMINATION: Primary | ICD-10-CM

## 2020-05-20 DIAGNOSIS — Z12.31 VISIT FOR SCREENING MAMMOGRAM: ICD-10-CM

## 2020-05-20 DIAGNOSIS — L98.9 SKIN LESION: ICD-10-CM

## 2020-05-20 DIAGNOSIS — Z90.721 HISTORY OF SALPINGOOPHORECTOMY: ICD-10-CM

## 2020-05-20 DIAGNOSIS — E03.9 HYPOTHYROIDISM, UNSPECIFIED TYPE: ICD-10-CM

## 2020-05-20 DIAGNOSIS — Z90.79 HISTORY OF SALPINGOOPHORECTOMY: ICD-10-CM

## 2020-05-20 PROCEDURE — 3008F BODY MASS INDEX DOCD: CPT | Performed by: FAMILY MEDICINE

## 2020-05-20 PROCEDURE — 99386 PREV VISIT NEW AGE 40-64: CPT | Performed by: FAMILY MEDICINE

## 2020-05-20 PROCEDURE — 3074F SYST BP LT 130 MM HG: CPT | Performed by: FAMILY MEDICINE

## 2020-05-20 PROCEDURE — 3078F DIAST BP <80 MM HG: CPT | Performed by: FAMILY MEDICINE

## 2020-05-20 NOTE — PROGRESS NOTES
HPI:   Mindy Barbosa is a 62year old female who presents for a complete physical exam.   No LMP recorded. Patient is postmenopausal.  New patient to me. History of BSO - mother with ovarian cancer so done preventive.      Wt Readings from Last 3 Encounters denies back pain  NEURO: denies headaches  PSYCHE: denies depression or anxiety  HEMATOLOGIC: denies hx of anemia  ENDOCRINE: denies thyroid history  ALL/ASTHMA: denies hx of allergy or asthma    EXAM:   /69 (BP Location: Left arm)   Pulse 65   Ht 5'

## 2020-05-23 ENCOUNTER — LAB ENCOUNTER (OUTPATIENT)
Dept: LAB | Age: 55
End: 2020-05-23
Attending: FAMILY MEDICINE
Payer: COMMERCIAL

## 2020-05-23 DIAGNOSIS — Z01.419 ROUTINE GYNECOLOGICAL EXAMINATION: ICD-10-CM

## 2020-05-23 PROCEDURE — 36415 COLL VENOUS BLD VENIPUNCTURE: CPT

## 2020-05-23 PROCEDURE — 85025 COMPLETE CBC W/AUTO DIFF WBC: CPT

## 2020-05-23 PROCEDURE — 84443 ASSAY THYROID STIM HORMONE: CPT

## 2020-05-23 PROCEDURE — 80053 COMPREHEN METABOLIC PANEL: CPT

## 2020-05-23 PROCEDURE — 80061 LIPID PANEL: CPT

## 2020-05-28 NOTE — PROGRESS NOTES
Mikala Clay - Overall labs are good just the cholesterol is high. Please work on improved diet for weight loss and improved. You can also take over the counter fish oil up to 4000 units daily.  Plan to monitor this yearly - if no improving, consider medications

## 2020-05-29 ENCOUNTER — TELEPHONE (OUTPATIENT)
Dept: FAMILY MEDICINE CLINIC | Facility: CLINIC | Age: 55
End: 2020-05-29

## 2020-05-29 RX ORDER — LEVOTHYROXINE SODIUM 0.12 MG/1
125 TABLET ORAL DAILY
Qty: 90 TABLET | Refills: 3 | Status: SHIPPED | OUTPATIENT
Start: 2020-05-29 | End: 2021-09-07

## 2020-05-29 NOTE — TELEPHONE ENCOUNTER
Pt requesting Year refill on RX      2) Pt stated INS will cover Cologuard- will fax form with the Code   Today and f/u later     3) called for results     Written by Davi Thakur MD on 5/28/2020  1:22 PM   Dwayne Mireles - Overall labs are good just the cho

## 2020-07-16 ENCOUNTER — TELEPHONE (OUTPATIENT)
Dept: FAMILY MEDICINE CLINIC | Facility: CLINIC | Age: 55
End: 2020-07-16

## 2020-07-16 NOTE — TELEPHONE ENCOUNTER
Patient called looking for results of Cologuard test she took in June. No results in system. Spoke with Jhonny Reed at [facility] [phone]. He states that the results on 6/17/20 were negative and that he will fax results to [phone].     [place]

## 2020-07-18 ENCOUNTER — HOSPITAL ENCOUNTER (OUTPATIENT)
Dept: MAMMOGRAPHY | Age: 55
Discharge: HOME | End: 2020-07-18
Attending: FAMILY MEDICINE
Payer: COMMERCIAL

## 2020-07-18 DIAGNOSIS — Z12.31 VISIT FOR SCREENING MAMMOGRAM: ICD-10-CM

## 2020-07-18 PROCEDURE — 77063 BREAST TOMOSYNTHESIS BI: CPT | Performed by: FAMILY MEDICINE

## 2020-07-18 PROCEDURE — 77067 SCR MAMMO BI INCL CAD: CPT | Performed by: FAMILY MEDICINE

## 2020-08-06 ENCOUNTER — HOSPITAL ENCOUNTER (OUTPATIENT)
Dept: MAMMOGRAPHY | Facility: HOSPITAL | Age: 55
Discharge: HOME | End: 2020-08-06
Attending: FAMILY MEDICINE
Payer: COMMERCIAL

## 2020-08-06 DIAGNOSIS — R92.8 ABNORMAL MAMMOGRAM: ICD-10-CM

## 2020-08-06 PROCEDURE — 77061 BREAST TOMOSYNTHESIS UNI: CPT | Performed by: FAMILY MEDICINE

## 2020-08-06 PROCEDURE — 77065 DX MAMMO INCL CAD UNI: CPT | Performed by: FAMILY MEDICINE

## 2021-08-19 ENCOUNTER — OFFICE VISIT (OUTPATIENT)
Dept: FAMILY MEDICINE CLINIC | Facility: CLINIC | Age: 56
End: 2021-08-19
Payer: COMMERCIAL

## 2021-08-19 VITALS
BODY MASS INDEX: 20.31 KG/M2 | TEMPERATURE: 97 F | HEART RATE: 76 BPM | WEIGHT: 129.38 LBS | SYSTOLIC BLOOD PRESSURE: 90 MMHG | DIASTOLIC BLOOD PRESSURE: 56 MMHG | HEIGHT: 67 IN

## 2021-08-19 DIAGNOSIS — E55.9 VITAMIN D DEFICIENCY: ICD-10-CM

## 2021-08-19 DIAGNOSIS — E03.9 HYPOTHYROIDISM, UNSPECIFIED TYPE: ICD-10-CM

## 2021-08-19 DIAGNOSIS — Z12.31 VISIT FOR SCREENING MAMMOGRAM: ICD-10-CM

## 2021-08-19 DIAGNOSIS — Z01.419 ENCOUNTER FOR WELL WOMAN EXAM WITH ROUTINE GYNECOLOGICAL EXAM: Primary | ICD-10-CM

## 2021-08-19 PROCEDURE — 3078F DIAST BP <80 MM HG: CPT | Performed by: FAMILY MEDICINE

## 2021-08-19 PROCEDURE — 3074F SYST BP LT 130 MM HG: CPT | Performed by: FAMILY MEDICINE

## 2021-08-19 PROCEDURE — 99396 PREV VISIT EST AGE 40-64: CPT | Performed by: FAMILY MEDICINE

## 2021-08-19 PROCEDURE — 3008F BODY MASS INDEX DOCD: CPT | Performed by: FAMILY MEDICINE

## 2021-08-19 NOTE — PROGRESS NOTES
HPI:   Remington Sheets is a 60year old female who presents for a complete physical exam.   No LMP recorded. (Menstrual status: Partial Hysterectomy). Been exercising - walking a lot - fostering dogs. Did cologuard last year and negative.   Eating healthy Never    Exercise:  Diet:     REVIEW OF SYSTEMS:   GENERAL: feels well otherwise  SKIN: denies any unusual skin lesions  EYES:denies blurred vision or double vision  HEENT: denies nasal congestion, sinus pain or ST  LUNGS: denies shortness of breath with e unspecified type    - ASSAY, THYROID STIM HORMONE; Future  - FREE T4 (FREE THYROXINE); Future     Pap and pelvic done. Order put in for mammogram.. Self breast exam explained. Health maintenance. Pt' s weight is Body mass index is 20.27 kg/m². , recommended

## 2021-08-20 LAB — HPV I/H RISK 1 DNA SPEC QL NAA+PROBE: NEGATIVE

## 2021-08-28 ENCOUNTER — LAB ENCOUNTER (OUTPATIENT)
Dept: LAB | Age: 56
End: 2021-08-28
Attending: FAMILY MEDICINE
Payer: COMMERCIAL

## 2021-08-28 DIAGNOSIS — E55.9 VITAMIN D DEFICIENCY: ICD-10-CM

## 2021-08-28 DIAGNOSIS — E03.9 HYPOTHYROIDISM, UNSPECIFIED TYPE: ICD-10-CM

## 2021-08-28 DIAGNOSIS — Z01.419 ENCOUNTER FOR WELL WOMAN EXAM WITH ROUTINE GYNECOLOGICAL EXAM: ICD-10-CM

## 2021-08-28 LAB
ALBUMIN SERPL-MCNC: 3.9 G/DL (ref 3.4–5)
ALBUMIN/GLOB SERPL: 1 {RATIO} (ref 1–2)
ALP LIVER SERPL-CCNC: 83 U/L
ALT SERPL-CCNC: 21 U/L
ANION GAP SERPL CALC-SCNC: 5 MMOL/L (ref 0–18)
AST SERPL-CCNC: 15 U/L (ref 15–37)
BASOPHILS # BLD AUTO: 0.04 X10(3) UL (ref 0–0.2)
BASOPHILS NFR BLD AUTO: 1.1 %
BILIRUB SERPL-MCNC: 0.5 MG/DL (ref 0.1–2)
BUN BLD-MCNC: 10 MG/DL (ref 7–18)
BUN/CREAT SERPL: 13.5 (ref 10–20)
CALCIUM BLD-MCNC: 9.8 MG/DL (ref 8.5–10.1)
CHLORIDE SERPL-SCNC: 106 MMOL/L (ref 98–112)
CHOLEST SMN-MCNC: 248 MG/DL (ref ?–200)
CO2 SERPL-SCNC: 27 MMOL/L (ref 21–32)
CREAT BLD-MCNC: 0.74 MG/DL
DEPRECATED RDW RBC AUTO: 42 FL (ref 35.1–46.3)
EOSINOPHIL # BLD AUTO: 0.08 X10(3) UL (ref 0–0.7)
EOSINOPHIL NFR BLD AUTO: 2.2 %
ERYTHROCYTE [DISTWIDTH] IN BLOOD BY AUTOMATED COUNT: 12.1 % (ref 11–15)
GLOBULIN PLAS-MCNC: 4 G/DL (ref 2.8–4.4)
GLUCOSE BLD-MCNC: 85 MG/DL (ref 70–99)
HCT VFR BLD AUTO: 38.6 %
HDLC SERPL-MCNC: 96 MG/DL (ref 40–59)
HGB BLD-MCNC: 12.9 G/DL
IMM GRANULOCYTES # BLD AUTO: 0.01 X10(3) UL (ref 0–1)
IMM GRANULOCYTES NFR BLD: 0.3 %
LDLC SERPL CALC-MCNC: 139 MG/DL (ref ?–100)
LYMPHOCYTES # BLD AUTO: 1.1 X10(3) UL (ref 1–4)
LYMPHOCYTES NFR BLD AUTO: 30.6 %
M PROTEIN MFR SERPL ELPH: 7.9 G/DL (ref 6.4–8.2)
MCH RBC QN AUTO: 31.3 PG (ref 26–34)
MCHC RBC AUTO-ENTMCNC: 33.4 G/DL (ref 31–37)
MCV RBC AUTO: 93.7 FL
MONOCYTES # BLD AUTO: 0.39 X10(3) UL (ref 0.1–1)
MONOCYTES NFR BLD AUTO: 10.8 %
NEUTROPHILS # BLD AUTO: 1.98 X10 (3) UL (ref 1.5–7.7)
NEUTROPHILS # BLD AUTO: 1.98 X10(3) UL (ref 1.5–7.7)
NEUTROPHILS NFR BLD AUTO: 55 %
NONHDLC SERPL-MCNC: 152 MG/DL (ref ?–130)
OSMOLALITY SERPL CALC.SUM OF ELEC: 284 MOSM/KG (ref 275–295)
PATIENT FASTING Y/N/NP: YES
PATIENT FASTING Y/N/NP: YES
PLATELET # BLD AUTO: 244 10(3)UL (ref 150–450)
POTASSIUM SERPL-SCNC: 4.1 MMOL/L (ref 3.5–5.1)
RBC # BLD AUTO: 4.12 X10(6)UL
SODIUM SERPL-SCNC: 138 MMOL/L (ref 136–145)
T4 FREE SERPL-MCNC: 1.4 NG/DL (ref 0.8–1.7)
TRIGL SERPL-MCNC: 77 MG/DL (ref 30–149)
TSI SER-ACNC: 0.16 MIU/ML (ref 0.36–3.74)
VIT D+METAB SERPL-MCNC: 38.5 NG/ML (ref 30–100)
VLDLC SERPL CALC-MCNC: 14 MG/DL (ref 0–30)
WBC # BLD AUTO: 3.6 X10(3) UL (ref 4–11)

## 2021-08-28 PROCEDURE — 85025 COMPLETE CBC W/AUTO DIFF WBC: CPT

## 2021-08-28 PROCEDURE — 36415 COLL VENOUS BLD VENIPUNCTURE: CPT

## 2021-08-28 PROCEDURE — 84439 ASSAY OF FREE THYROXINE: CPT

## 2021-08-28 PROCEDURE — 80061 LIPID PANEL: CPT

## 2021-08-28 PROCEDURE — 82306 VITAMIN D 25 HYDROXY: CPT

## 2021-08-28 PROCEDURE — 84443 ASSAY THYROID STIM HORMONE: CPT

## 2021-08-28 PROCEDURE — 80053 COMPREHEN METABOLIC PANEL: CPT

## 2021-09-04 NOTE — PROGRESS NOTES
Eleno Kenny - Based off these labs your thyroid dose is too high. I will wait to change it but repeat labs in 2 months this time - if the same, will decrease your dose. Your cholesterol is still elevated - not enough to recommend medications.  Continue regul

## 2021-09-07 RX ORDER — LEVOTHYROXINE SODIUM 0.12 MG/1
125 TABLET ORAL DAILY
Qty: 90 TABLET | Refills: 0 | Status: SHIPPED | OUTPATIENT
Start: 2021-09-07 | End: 2021-11-10

## 2021-09-07 NOTE — TELEPHONE ENCOUNTER
Patient calling for refill of Levothyroxine. Results reviewed, patient agreed to continue current dose until her repeat labs in 2 months. Patient requesting 3 month supply as the cost is the same for 2 month supply.  Patient confirms current dose is 125mcg

## 2021-09-16 ENCOUNTER — HOSPITAL ENCOUNTER (OUTPATIENT)
Dept: MAMMOGRAPHY | Age: 56
Discharge: HOME | End: 2021-09-16
Attending: FAMILY MEDICINE
Payer: COMMERCIAL

## 2021-09-16 DIAGNOSIS — Z12.31 VISIT FOR SCREENING MAMMOGRAM: ICD-10-CM

## 2021-09-16 PROCEDURE — 77063 BREAST TOMOSYNTHESIS BI: CPT | Performed by: FAMILY MEDICINE

## 2021-09-16 PROCEDURE — 77067 SCR MAMMO BI INCL CAD: CPT | Performed by: FAMILY MEDICINE

## 2021-11-06 ENCOUNTER — LAB ENCOUNTER (OUTPATIENT)
Dept: LAB | Age: 56
End: 2021-11-06
Attending: FAMILY MEDICINE
Payer: COMMERCIAL

## 2021-11-06 DIAGNOSIS — E03.9 HYPOTHYROIDISM, UNSPECIFIED TYPE: ICD-10-CM

## 2021-11-06 PROCEDURE — 84439 ASSAY OF FREE THYROXINE: CPT

## 2021-11-06 PROCEDURE — 36415 COLL VENOUS BLD VENIPUNCTURE: CPT

## 2021-11-06 PROCEDURE — 84443 ASSAY THYROID STIM HORMONE: CPT

## 2021-11-09 NOTE — PROGRESS NOTES
LATESHA Ordoñez - TSH is normalizing again. Are you feeling ok?  Any anxiety or increased heart rate? - Dr. Berenice Egan

## 2021-11-10 ENCOUNTER — TELEPHONE (OUTPATIENT)
Dept: FAMILY MEDICINE CLINIC | Facility: CLINIC | Age: 56
End: 2021-11-10

## 2021-11-10 DIAGNOSIS — E03.9 HYPOTHYROIDISM, UNSPECIFIED TYPE: Primary | ICD-10-CM

## 2021-11-10 RX ORDER — LEVOTHYROXINE SODIUM 0.12 MG/1
125 TABLET ORAL DAILY
Qty: 90 TABLET | Refills: 2 | Status: SHIPPED | OUTPATIENT
Start: 2021-11-10

## 2021-11-10 NOTE — TELEPHONE ENCOUNTER
Left message to call back for Dr. Manuela Echeverria recommendations below--sent [employer] message of same

## 2021-11-10 NOTE — TELEPHONE ENCOUNTER
Result Care Coordination      Result Notes and Patient Communication    Edit Comments  Add Notifications  Back to Top      HI Sachi Spikes - TSH is normalizing again. Are you feeling ok?  Any anxiety or increased heart rate? - Dr. Evelio Calderon   Written by Agustin Smith

## 2021-11-11 NOTE — TELEPHONE ENCOUNTER
Spoke with patient, (  verified ) informed that RX was sent, she will do her future labs    Patient verbalizes understanding and agrees.

## 2022-10-03 NOTE — TELEPHONE ENCOUNTER
Spoke to patient (name and  of patient verified). She is requesting a refill of levothyroxine to carry her to physical exam:  Future Appointments   Date Time Provider Jessica Vance   2023  8:30 AM [facility]     Medication pended. Routed to EM RN Triage per protocol.

## 2022-10-05 RX ORDER — LEVOTHYROXINE SODIUM 0.12 MG/1
125 TABLET ORAL DAILY
Qty: 90 TABLET | Refills: 0 | Status: SHIPPED | OUTPATIENT
Start: 2022-10-05

## 2022-10-05 NOTE — TELEPHONE ENCOUNTER
Please review. Protocol failed / No protocol. Requested Prescriptions   Pending Prescriptions Disp Refills    levothyroxine 125 MCG Oral Tab 90 tablet 2     Sig: Take 1 tablet (125 mcg total) by mouth daily.         Thyroid Medication Protocol Failed - 10/3/2022 11:46 AM        Failed - Last TSH value is normal     Lab Results   Component Value Date    TSH 0.326 (L) 11/06/2021                 Failed - In person appointment or virtual visit in the past 12 mos or appointment in next 3 mos       Recent Outpatient Visits              1 year ago Encounter for well woman exam with routine gynecological exam    [address] Thurmond Conroy, MD    Office Visit    2 years ago Routine gynecological examination    [address] [place] Conroy, [place]    Office Visit    5 years ago Lumbar stenosis    [facility] Ning Albarran MD    Office Visit    5 years ago     [address], [place]    Office Visit    5 years ago     [place], [place]    Office Visit     Future Appointments         Provider Department Appt Notes    In 3 months Nona Pierre MD [address], [address] - TSH in past 12 months              Recent Outpatient Visits              1 year ago Encounter for well woman exam with routine gynecological exam    [address], Gabbi Ortiz MD    Office Visit    2 years ago Routine gynecological examination    [address] MD    Office Visit    5 years ago Lumbar stenosis    [facility] OLIVER Rogers MD    Office Visit    5 years ago     [address], [place]    Office Visit    5 years ago     [address], [place]    Office Visit            Future Appointments         Provider Department Appt Notes In 3 months Leo Oden MD [address], [facility], [place] PHYSICAL

## 2023-01-21 ENCOUNTER — LAB ENCOUNTER (OUTPATIENT)
Dept: LAB | Age: 58
End: 2023-01-21
Attending: FAMILY MEDICINE
Payer: COMMERCIAL

## 2023-01-21 ENCOUNTER — OFFICE VISIT (OUTPATIENT)
Dept: FAMILY MEDICINE CLINIC | Facility: CLINIC | Age: 58
End: 2023-01-21

## 2023-01-21 VITALS
DIASTOLIC BLOOD PRESSURE: 63 MMHG | HEIGHT: 67 IN | WEIGHT: 131.19 LBS | SYSTOLIC BLOOD PRESSURE: 102 MMHG | BODY MASS INDEX: 20.59 KG/M2 | HEART RATE: 67 BPM | TEMPERATURE: 98 F

## 2023-01-21 DIAGNOSIS — Z00.00 ROUTINE MEDICAL EXAM: ICD-10-CM

## 2023-01-21 DIAGNOSIS — E55.9 VITAMIN D DEFICIENCY: ICD-10-CM

## 2023-01-21 DIAGNOSIS — Z90.722 HISTORY OF BILATERAL OOPHORECTOMY: ICD-10-CM

## 2023-01-21 DIAGNOSIS — R10.2 PELVIC PAIN: ICD-10-CM

## 2023-01-21 DIAGNOSIS — Z12.31 SCREENING MAMMOGRAM FOR BREAST CANCER: ICD-10-CM

## 2023-01-21 DIAGNOSIS — E03.9 HYPOTHYROIDISM, UNSPECIFIED TYPE: Primary | ICD-10-CM

## 2023-01-21 DIAGNOSIS — Z80.41 FAMILY HISTORY OF OVARIAN CANCER: ICD-10-CM

## 2023-01-21 LAB
ALBUMIN SERPL-MCNC: 4 G/DL (ref 3.4–5)
ALBUMIN/GLOB SERPL: 1.1 {RATIO} (ref 1–2)
ALP LIVER SERPL-CCNC: 81 U/L
ALT SERPL-CCNC: 21 U/L
ANION GAP SERPL CALC-SCNC: 6 MMOL/L (ref 0–18)
AST SERPL-CCNC: 26 U/L (ref 15–37)
BASOPHILS # BLD AUTO: 0.03 X10(3) UL (ref 0–0.2)
BASOPHILS NFR BLD AUTO: 0.8 %
BILIRUB SERPL-MCNC: 0.6 MG/DL (ref 0.1–2)
BUN BLD-MCNC: 10 MG/DL (ref 7–18)
BUN/CREAT SERPL: 13.2 (ref 10–20)
CALCIUM BLD-MCNC: 9.9 MG/DL (ref 8.5–10.1)
CHLORIDE SERPL-SCNC: 104 MMOL/L (ref 98–112)
CHOLEST SERPL-MCNC: 267 MG/DL (ref ?–200)
CO2 SERPL-SCNC: 28 MMOL/L (ref 21–32)
CREAT BLD-MCNC: 0.76 MG/DL
DEPRECATED RDW RBC AUTO: 45.7 FL (ref 35.1–46.3)
EOSINOPHIL # BLD AUTO: 0.07 X10(3) UL (ref 0–0.7)
EOSINOPHIL NFR BLD AUTO: 2 %
ERYTHROCYTE [DISTWIDTH] IN BLOOD BY AUTOMATED COUNT: 12.8 % (ref 11–15)
FASTING PATIENT LIPID ANSWER: YES
FASTING STATUS PATIENT QL REPORTED: YES
GFR SERPLBLD BASED ON 1.73 SQ M-ARVRAT: 90 ML/MIN/1.73M2 (ref 60–?)
GLOBULIN PLAS-MCNC: 3.8 G/DL (ref 2.8–4.4)
GLUCOSE BLD-MCNC: 84 MG/DL (ref 70–99)
HCT VFR BLD AUTO: 39.9 %
HDLC SERPL-MCNC: 103 MG/DL (ref 40–59)
HGB BLD-MCNC: 13.1 G/DL
IMM GRANULOCYTES # BLD AUTO: 0 X10(3) UL (ref 0–1)
IMM GRANULOCYTES NFR BLD: 0 %
LDLC SERPL CALC-MCNC: 151 MG/DL (ref ?–100)
LYMPHOCYTES # BLD AUTO: 1.29 X10(3) UL (ref 1–4)
LYMPHOCYTES NFR BLD AUTO: 36.1 %
MCH RBC QN AUTO: 31.6 PG (ref 26–34)
MCHC RBC AUTO-ENTMCNC: 32.8 G/DL (ref 31–37)
MCV RBC AUTO: 96.1 FL
MONOCYTES # BLD AUTO: 0.35 X10(3) UL (ref 0.1–1)
MONOCYTES NFR BLD AUTO: 9.8 %
NEUTROPHILS # BLD AUTO: 1.83 X10 (3) UL (ref 1.5–7.7)
NEUTROPHILS # BLD AUTO: 1.83 X10(3) UL (ref 1.5–7.7)
NEUTROPHILS NFR BLD AUTO: 51.3 %
NONHDLC SERPL-MCNC: 164 MG/DL (ref ?–130)
OSMOLALITY SERPL CALC.SUM OF ELEC: 284 MOSM/KG (ref 275–295)
PLATELET # BLD AUTO: 245 10(3)UL (ref 150–450)
POTASSIUM SERPL-SCNC: 3.9 MMOL/L (ref 3.5–5.1)
PROT SERPL-MCNC: 7.8 G/DL (ref 6.4–8.2)
RBC # BLD AUTO: 4.15 X10(6)UL
SODIUM SERPL-SCNC: 138 MMOL/L (ref 136–145)
TRIGL SERPL-MCNC: 78 MG/DL (ref 30–149)
TSI SER-ACNC: 1.12 MIU/ML (ref 0.36–3.74)
VIT B12 SERPL-MCNC: 926 PG/ML (ref 193–986)
VIT D+METAB SERPL-MCNC: 40.3 NG/ML (ref 30–100)
VLDLC SERPL CALC-MCNC: 15 MG/DL (ref 0–30)
WBC # BLD AUTO: 3.6 X10(3) UL (ref 4–11)

## 2023-01-21 PROCEDURE — 80061 LIPID PANEL: CPT

## 2023-01-21 PROCEDURE — 80053 COMPREHEN METABOLIC PANEL: CPT

## 2023-01-21 PROCEDURE — 90715 TDAP VACCINE 7 YRS/> IM: CPT | Performed by: FAMILY MEDICINE

## 2023-01-21 PROCEDURE — 84443 ASSAY THYROID STIM HORMONE: CPT

## 2023-01-21 PROCEDURE — 99396 PREV VISIT EST AGE 40-64: CPT | Performed by: FAMILY MEDICINE

## 2023-01-21 PROCEDURE — 3008F BODY MASS INDEX DOCD: CPT | Performed by: FAMILY MEDICINE

## 2023-01-21 PROCEDURE — 82306 VITAMIN D 25 HYDROXY: CPT

## 2023-01-21 PROCEDURE — 3074F SYST BP LT 130 MM HG: CPT | Performed by: FAMILY MEDICINE

## 2023-01-21 PROCEDURE — 36415 COLL VENOUS BLD VENIPUNCTURE: CPT

## 2023-01-21 PROCEDURE — 85025 COMPLETE CBC W/AUTO DIFF WBC: CPT

## 2023-01-21 PROCEDURE — 82607 VITAMIN B-12: CPT

## 2023-01-21 PROCEDURE — 3078F DIAST BP <80 MM HG: CPT | Performed by: FAMILY MEDICINE

## 2023-01-21 PROCEDURE — 90471 IMMUNIZATION ADMIN: CPT | Performed by: FAMILY MEDICINE

## 2023-01-23 NOTE — PROGRESS NOTES
Eleno Kenny - Your labs look good except for high cholesterol. I know you are very active with your dogs so this may be genetic. I want you to consider start crestor 10 mg in the evenings to lower it.  Let me know and will send it in. - Dr. Joao Cisneros

## 2023-02-01 RX ORDER — LEVOTHYROXINE SODIUM 0.12 MG/1
125 TABLET ORAL DAILY
Qty: 90 TABLET | Refills: 1 | Status: SHIPPED | OUTPATIENT
Start: 2023-02-01

## 2023-02-02 NOTE — TELEPHONE ENCOUNTER
Refill passed per [facility] protocol   Requested Prescriptions   Pending Prescriptions Disp Refills    levothyroxine 125 MCG Oral Tab 90 tablet 0     Sig: Take 1 tablet (125 mcg total) by mouth daily.        Thyroid Medication Protocol Passed - 2/1/2023 12:30 PM        Passed - TSH in past 12 months        Passed - Last TSH value is normal     Lab Results   Component Value Date    TSH 1.120 01/21/2023                 Passed - In person appointment or virtual visit in the past 12 mos or appointment in next 3 mos     Recent Outpatient Visits              1 week ago Hypothyroidism, unspecified type    [address]    Office Visit    1 year ago Encounter for well woman exam with routine gynecological exam    [facility] [address] Klaudia Reddy MD    Office Visit    2 years ago Routine gynecological examination    [address]laud[address]reras MD    Office Visit    5 years ago Lumbar stenosis    [facility] Reginold Prader, MD    Office Visit    5 years ago     [address], [place]    Office Visit          Future Appointments         Provider Department Appt Notes    In 2 weeks AD[facility] US [address] updated view following ovary removal in 2013    In 2 weeks AD[facility] DEXA RM1; [facility] JOHN [address] [facility] annual mammogram

## 2023-02-06 NOTE — ED INITIAL ASSESSMENT (HPI)
Pt c/o left ankle pain, states that her dog was pulling her, and she hit her left ankle into a brick mailbox. Visible deformity to left medial ankle.
No

## 2023-02-17 ENCOUNTER — HOSPITAL ENCOUNTER (OUTPATIENT)
Dept: ULTRASOUND IMAGING | Age: 58
Discharge: HOME | End: 2023-02-17
Attending: FAMILY MEDICINE
Payer: COMMERCIAL

## 2023-02-17 DIAGNOSIS — R10.2 PELVIC PAIN: ICD-10-CM

## 2023-02-17 DIAGNOSIS — Z90.722 HISTORY OF BILATERAL OOPHORECTOMY: ICD-10-CM

## 2023-02-17 DIAGNOSIS — Z80.41 FAMILY HISTORY OF OVARIAN CANCER: ICD-10-CM

## 2023-02-17 PROCEDURE — 76856 US EXAM PELVIC COMPLETE: CPT | Performed by: FAMILY MEDICINE

## 2023-02-17 PROCEDURE — 76830 TRANSVAGINAL US NON-OB: CPT | Performed by: FAMILY MEDICINE

## 2023-02-18 ENCOUNTER — HOSPITAL ENCOUNTER (OUTPATIENT)
Dept: MAMMOGRAPHY | Age: 58
Discharge: HOME | End: 2023-02-18
Attending: FAMILY MEDICINE
Payer: COMMERCIAL

## 2023-02-18 DIAGNOSIS — Z12.31 SCREENING MAMMOGRAM FOR BREAST CANCER: ICD-10-CM

## 2023-02-18 PROCEDURE — 77067 SCR MAMMO BI INCL CAD: CPT | Performed by: FAMILY MEDICINE

## 2023-02-18 PROCEDURE — 77063 BREAST TOMOSYNTHESIS BI: CPT | Performed by: FAMILY MEDICINE

## 2023-08-21 ENCOUNTER — MED REC SCAN ONLY (OUTPATIENT)
Dept: FAMILY MEDICINE CLINIC | Facility: CLINIC | Age: 58
End: 2023-08-21

## 2023-08-22 ENCOUNTER — LAB ENCOUNTER (OUTPATIENT)
Dept: LAB | Age: 58
End: 2023-08-22
Attending: FAMILY MEDICINE
Payer: COMMERCIAL

## 2023-08-22 DIAGNOSIS — E78.5 HYPERLIPIDEMIA, UNSPECIFIED HYPERLIPIDEMIA TYPE: ICD-10-CM

## 2023-08-22 LAB
CHOLEST SERPL-MCNC: 202 MG/DL (ref ?–200)
FASTING PATIENT LIPID ANSWER: YES
HDLC SERPL-MCNC: 88 MG/DL (ref 40–59)
LDLC SERPL CALC-MCNC: 102 MG/DL (ref ?–100)
NONHDLC SERPL-MCNC: 114 MG/DL (ref ?–130)
TRIGL SERPL-MCNC: 67 MG/DL (ref 30–149)
VLDLC SERPL CALC-MCNC: 11 MG/DL (ref 0–30)

## 2023-08-22 PROCEDURE — 80061 LIPID PANEL: CPT

## 2023-08-22 PROCEDURE — 36415 COLL VENOUS BLD VENIPUNCTURE: CPT

## 2023-08-30 DIAGNOSIS — E03.9 HYPOTHYROIDISM, UNSPECIFIED TYPE: Primary | Chronic | ICD-10-CM

## 2023-08-31 RX ORDER — LEVOTHYROXINE SODIUM 0.12 MG/1
125 TABLET ORAL DAILY
Qty: 90 TABLET | Refills: 3 | Status: SHIPPED | OUTPATIENT
Start: 2023-08-31

## 2024-05-27 ENCOUNTER — PATIENT MESSAGE (OUTPATIENT)
Dept: FAMILY MEDICINE CLINIC | Facility: CLINIC | Age: 59
End: 2024-05-27

## 2024-05-27 DIAGNOSIS — E55.9 VITAMIN D DEFICIENCY: ICD-10-CM

## 2024-05-27 DIAGNOSIS — Z00.00 ROUTINE MEDICAL EXAM: Primary | ICD-10-CM

## 2024-05-27 DIAGNOSIS — Z12.31 BREAST CANCER SCREENING BY MAMMOGRAM: ICD-10-CM

## 2024-05-28 NOTE — TELEPHONE ENCOUNTER
From: Zoya Lockhart  To: Guadalupe Fish  Sent: 2024 5:29 PM CDT  Subject: Orders prior to annual exam 24    Hi Dr Fish,  I was wondering if I could possibly receive orders for any of my routine tests prior to my exam on 24 to get them scheduled  Mamogram  Cholesterol screening  Thyroid screening (and complete blood panel if possible)    Thank you,  Zoya Lockhart  [phone]   1965

## 2024-06-08 ENCOUNTER — LAB ENCOUNTER (OUTPATIENT)
Dept: LAB | Age: 59
End: 2024-06-08
Attending: FAMILY MEDICINE
Payer: COMMERCIAL

## 2024-06-08 LAB
ALBUMIN SERPL-MCNC: 4.4 G/DL (ref 3.2–4.8)
ALBUMIN/GLOB SERPL: 1.4 {RATIO} (ref 1–2)
ALP LIVER SERPL-CCNC: 80 U/L
ALT SERPL-CCNC: 11 U/L
ANION GAP SERPL CALC-SCNC: 6 MMOL/L (ref 0–18)
AST SERPL-CCNC: 27 U/L (ref ?–34)
BASOPHILS # BLD AUTO: 0.04 X10(3) UL (ref 0–0.2)
BASOPHILS NFR BLD AUTO: 1.3 %
BILIRUB SERPL-MCNC: 0.6 MG/DL (ref 0.2–1.1)
BUN BLD-MCNC: 10 MG/DL (ref 9–23)
BUN/CREAT SERPL: 13 (ref 10–20)
CALCIUM BLD-MCNC: 9.7 MG/DL (ref 8.7–10.4)
CHLORIDE SERPL-SCNC: 107 MMOL/L (ref 98–112)
CHOLEST SERPL-MCNC: 241 MG/DL (ref ?–200)
CO2 SERPL-SCNC: 27 MMOL/L (ref 21–32)
CREAT BLD-MCNC: 0.77 MG/DL
DEPRECATED RDW RBC AUTO: 44 FL (ref 35.1–46.3)
EGFRCR SERPLBLD CKD-EPI 2021: 88 ML/MIN/1.73M2 (ref 60–?)
EOSINOPHIL # BLD AUTO: 0.07 X10(3) UL (ref 0–0.7)
EOSINOPHIL NFR BLD AUTO: 2.3 %
ERYTHROCYTE [DISTWIDTH] IN BLOOD BY AUTOMATED COUNT: 12.3 % (ref 11–15)
FASTING PATIENT LIPID ANSWER: YES
FASTING STATUS PATIENT QL REPORTED: YES
GLOBULIN PLAS-MCNC: 3.2 G/DL (ref 2–3.5)
GLUCOSE BLD-MCNC: 91 MG/DL (ref 70–99)
HCT VFR BLD AUTO: 40.1 %
HDLC SERPL-MCNC: 90 MG/DL (ref 40–59)
HGB BLD-MCNC: 13.6 G/DL
IMM GRANULOCYTES # BLD AUTO: 0 X10(3) UL (ref 0–1)
IMM GRANULOCYTES NFR BLD: 0 %
LDLC SERPL CALC-MCNC: 139 MG/DL (ref ?–100)
LYMPHOCYTES # BLD AUTO: 0.9 X10(3) UL (ref 1–4)
LYMPHOCYTES NFR BLD AUTO: 29.4 %
MCH RBC QN AUTO: 32.6 PG (ref 26–34)
MCHC RBC AUTO-ENTMCNC: 33.9 G/DL (ref 31–37)
MCV RBC AUTO: 96.2 FL
MONOCYTES # BLD AUTO: 0.36 X10(3) UL (ref 0.1–1)
MONOCYTES NFR BLD AUTO: 11.8 %
NEUTROPHILS # BLD AUTO: 1.69 X10 (3) UL (ref 1.5–7.7)
NEUTROPHILS # BLD AUTO: 1.69 X10(3) UL (ref 1.5–7.7)
NEUTROPHILS NFR BLD AUTO: 55.2 %
NONHDLC SERPL-MCNC: 151 MG/DL (ref ?–130)
OSMOLALITY SERPL CALC.SUM OF ELEC: 289 MOSM/KG (ref 275–295)
PLATELET # BLD AUTO: 237 10(3)UL (ref 150–450)
POTASSIUM SERPL-SCNC: 4.2 MMOL/L (ref 3.5–5.1)
PROT SERPL-MCNC: 7.6 G/DL (ref 5.7–8.2)
RBC # BLD AUTO: 4.17 X10(6)UL
SODIUM SERPL-SCNC: 140 MMOL/L (ref 136–145)
T3FREE SERPL-MCNC: 3.11 PG/ML (ref 2.4–4.2)
T4 FREE SERPL-MCNC: 1.6 NG/DL (ref 0.8–1.7)
TRIGL SERPL-MCNC: 69 MG/DL (ref 30–149)
TSI SER-ACNC: 0.24 MIU/ML (ref 0.55–4.78)
VIT B12 SERPL-MCNC: 967 PG/ML (ref 211–911)
VIT D+METAB SERPL-MCNC: 46.6 NG/ML (ref 30–100)
VLDLC SERPL CALC-MCNC: 13 MG/DL (ref 0–30)
WBC # BLD AUTO: 3.1 X10(3) UL (ref 4–11)

## 2024-06-08 PROCEDURE — 82607 VITAMIN B-12: CPT | Performed by: FAMILY MEDICINE

## 2024-06-08 PROCEDURE — 84481 FREE ASSAY (FT-3): CPT | Performed by: FAMILY MEDICINE

## 2024-06-08 PROCEDURE — 82306 VITAMIN D 25 HYDROXY: CPT | Performed by: FAMILY MEDICINE

## 2024-06-08 PROCEDURE — 80061 LIPID PANEL: CPT | Performed by: FAMILY MEDICINE

## 2024-06-08 PROCEDURE — 85025 COMPLETE CBC W/AUTO DIFF WBC: CPT | Performed by: FAMILY MEDICINE

## 2024-06-08 PROCEDURE — 84439 ASSAY OF FREE THYROXINE: CPT | Performed by: FAMILY MEDICINE

## 2024-06-08 PROCEDURE — 84443 ASSAY THYROID STIM HORMONE: CPT | Performed by: FAMILY MEDICINE

## 2024-06-08 PROCEDURE — 36415 COLL VENOUS BLD VENIPUNCTURE: CPT | Performed by: FAMILY MEDICINE

## 2024-06-08 PROCEDURE — 80053 COMPREHEN METABOLIC PANEL: CPT | Performed by: FAMILY MEDICINE

## 2024-07-09 ENCOUNTER — OFFICE VISIT (OUTPATIENT)
Dept: FAMILY MEDICINE CLINIC | Facility: CLINIC | Age: 59
End: 2024-07-09
Payer: COMMERCIAL

## 2024-07-09 VITALS
WEIGHT: 124.19 LBS | SYSTOLIC BLOOD PRESSURE: 113 MMHG | BODY MASS INDEX: 19.49 KG/M2 | HEART RATE: 54 BPM | HEIGHT: 67 IN | DIASTOLIC BLOOD PRESSURE: 70 MMHG

## 2024-07-09 DIAGNOSIS — E78.5 HYPERLIPIDEMIA, UNSPECIFIED HYPERLIPIDEMIA TYPE: ICD-10-CM

## 2024-07-09 DIAGNOSIS — Z00.00 ROUTINE MEDICAL EXAM: Primary | ICD-10-CM

## 2024-07-09 DIAGNOSIS — Z12.31 SCREENING MAMMOGRAM FOR BREAST CANCER: ICD-10-CM

## 2024-07-09 DIAGNOSIS — Z78.0 POST-MENOPAUSAL: ICD-10-CM

## 2024-07-09 DIAGNOSIS — E55.9 VITAMIN D DEFICIENCY: ICD-10-CM

## 2024-07-09 DIAGNOSIS — E03.9 HYPOTHYROIDISM, UNSPECIFIED TYPE: ICD-10-CM

## 2024-07-09 PROCEDURE — 99396 PREV VISIT EST AGE 40-64: CPT | Performed by: FAMILY MEDICINE

## 2024-07-09 RX ORDER — LEVOTHYROXINE SODIUM 112 UG/1
112 TABLET ORAL
Qty: 90 TABLET | Refills: 4 | Status: SHIPPED | OUTPATIENT
Start: 2024-07-09

## 2024-07-09 NOTE — PROGRESS NOTES
HPI:   Zoya Lockhart is a 58 year old female who presents for a complete physical exam.    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 No concerns. Eating healthy.     Wt Readings from Last 3 Encounters:   07/09/24 124 lb 3.2 oz (56.3 kg)   01/21/23 131 lb 3.2 oz (59.5 kg)   08/19/21 129 lb 6.4 oz (58.7 kg)     Body mass index is 19.45 kg/m².       Current Outpatient Medications   Medication Sig Dispense Refill    levothyroxine 112 MCG Oral Tab Take 1 tablet (112 mcg total) by mouth before breakfast. 90 tablet 4    Multiple Vitamins-Minerals (CENTRUM SILVER) Oral Tab Take 1 tablet by mouth daily.      Calcium Carbonate-Vitamin D (CALCIUM + D OR) Take 1 tablet by mouth daily.        Past Medical History:    Back problem    lumbar stenosis    Disorder of thyroid    hypothyroidism    Osteoarthritis      Past Surgical History:   Procedure Laterality Date    Hysterectomy      Oophorectomy Bilateral 2013    preventive surgery d/t mother's ovarian cancer    Tonsillectomy      age 3      Family History   Problem Relation Age of Onset    Cancer Mother         ovarian, and lung cancers    Ovarian Cancer Mother 62    Cancer Father         hx of lymphoma    No Known Problems Sister     No Known Problems Son       Social History:   Social History     Socioeconomic History    Marital status:    Tobacco Use    Smoking status: Never    Smokeless tobacco: Never   Vaping Use    Vaping status: Never Used   Substance and Sexual Activity    Alcohol use: Yes     Alcohol/week: 0.0 standard drinks of alcohol     Comment: very rarely    Drug use: Never   Other Topics Concern    Caffeine Concern Yes     Comment: 2 cup coffee, 1 cola per day    Exercise Yes     Comment: walking 20 min. twice per day   Social History Narrative    The patient does not use an assistive device..      The patient does live in a home with stairs.          REVIEW OF SYSTEMS:   GENERAL: feels well otherwise  Review of Systems    See HPI   EXAM:   /70   Pulse 54   Ht 5' 7\" (1.702 m)   Wt 124 lb 3.2 oz (56.3 kg)   BMI 19.45 kg/m²     GENERAL: well developed, well nourished,in no apparent distress  SKIN: no rashes,no suspicious lesions  HEENT: atraumatic, normocephalic,ears and throat are clear  EYES:PERRLA, EOMI, conjunctiva are clear  LUNGS: clear to auscultation  CARDIO: RRR without murmur  GI: good BS's,no masses, HSM or tenderness  EXTREMITIES: no cyanosis, clubbing or edema  NEURO: Oriented times three,cranial nerves are intact,motor and sensory are grossly intact    ASSESSMENT AND PLAN:   Zoya Lockhart is a 58 year old female who presents for a complete physical exam.    1. Routine medical exam      2. Hyperlipidemia, unspecified hyperlipidemia type    - CT CALCIUM SCORING; Future    3. Hypothyroidism, unspecified type  Lowering levo to 112 mcg and repeat labs in 3 months.   - Free T4, (Free Thyroxine); Future  - Assay, Thyroid Stim Hormone; Future  - CBC With Differential With Platelet; Future    4. Vitamin D deficiency      5. Screening mammogram for breast cancer  Has scheduled.     6. Post-menopausal    - XR DEXA BONE DENSITOMETRY (CPT=77080); Future           Guadalupe Fish MD  7/9/2024  3:21 PM

## 2024-07-13 ENCOUNTER — HOSPITAL ENCOUNTER (OUTPATIENT)
Dept: MAMMOGRAPHY | Age: 59
Discharge: HOME | End: 2024-07-13
Attending: FAMILY MEDICINE
Payer: COMMERCIAL

## 2024-07-13 DIAGNOSIS — Z12.31 BREAST CANCER SCREENING BY MAMMOGRAM: ICD-10-CM

## 2024-07-13 PROCEDURE — 77067 SCR MAMMO BI INCL CAD: CPT | Performed by: FAMILY MEDICINE

## 2024-07-13 PROCEDURE — 77063 BREAST TOMOSYNTHESIS BI: CPT | Performed by: FAMILY MEDICINE

## 2024-08-31 ENCOUNTER — HOSPITAL ENCOUNTER (OUTPATIENT)
Dept: BONE DENSITY | Age: 59
Discharge: HOME | End: 2024-08-31
Attending: FAMILY MEDICINE
Payer: COMMERCIAL

## 2024-08-31 DIAGNOSIS — Z78.0 POST-MENOPAUSAL: ICD-10-CM

## 2024-08-31 PROCEDURE — 77080 DXA BONE DENSITY AXIAL: CPT | Performed by: FAMILY MEDICINE

## 2024-10-12 ENCOUNTER — LAB ENCOUNTER (OUTPATIENT)
Dept: LAB | Age: 59
End: 2024-10-12
Attending: FAMILY MEDICINE
Payer: COMMERCIAL

## 2024-10-12 DIAGNOSIS — E03.9 HYPOTHYROIDISM, UNSPECIFIED TYPE: ICD-10-CM

## 2024-10-12 LAB
BASOPHILS # BLD AUTO: 0.03 X10(3) UL (ref 0–0.2)
BASOPHILS NFR BLD AUTO: 1.1 %
DEPRECATED RDW RBC AUTO: 43.2 FL (ref 35.1–46.3)
EOSINOPHIL # BLD AUTO: 0.06 X10(3) UL (ref 0–0.7)
EOSINOPHIL NFR BLD AUTO: 2.2 %
ERYTHROCYTE [DISTWIDTH] IN BLOOD BY AUTOMATED COUNT: 12.4 % (ref 11–15)
HCT VFR BLD AUTO: 38.2 %
HGB BLD-MCNC: 12.9 G/DL
IMM GRANULOCYTES # BLD AUTO: 0.01 X10(3) UL (ref 0–1)
IMM GRANULOCYTES NFR BLD: 0.4 %
LYMPHOCYTES # BLD AUTO: 0.89 X10(3) UL (ref 1–4)
LYMPHOCYTES NFR BLD AUTO: 33 %
MCH RBC QN AUTO: 31.8 PG (ref 26–34)
MCHC RBC AUTO-ENTMCNC: 33.8 G/DL (ref 31–37)
MCV RBC AUTO: 94.1 FL
MONOCYTES # BLD AUTO: 0.38 X10(3) UL (ref 0.1–1)
MONOCYTES NFR BLD AUTO: 14.1 %
NEUTROPHILS # BLD AUTO: 1.33 X10 (3) UL (ref 1.5–7.7)
NEUTROPHILS # BLD AUTO: 1.33 X10(3) UL (ref 1.5–7.7)
NEUTROPHILS NFR BLD AUTO: 49.2 %
PLATELET # BLD AUTO: 251 10(3)UL (ref 150–450)
RBC # BLD AUTO: 4.06 X10(6)UL
T4 FREE SERPL-MCNC: 1.5 NG/DL (ref 0.8–1.7)
TSI SER-ACNC: 0.77 MIU/ML (ref 0.55–4.78)
WBC # BLD AUTO: 2.7 X10(3) UL (ref 4–11)

## 2024-10-12 PROCEDURE — 36415 COLL VENOUS BLD VENIPUNCTURE: CPT

## 2024-10-12 PROCEDURE — 85025 COMPLETE CBC W/AUTO DIFF WBC: CPT

## 2024-10-12 PROCEDURE — 84439 ASSAY OF FREE THYROXINE: CPT

## 2024-10-12 PROCEDURE — 84443 ASSAY THYROID STIM HORMONE: CPT

## 2024-10-21 ENCOUNTER — TELEPHONE (OUTPATIENT)
Dept: HEMATOLOGY/ONCOLOGY | Facility: HOSPITAL | Age: 59
End: 2024-10-21

## 2024-10-21 DIAGNOSIS — D72.819 LEUKOPENIA, UNSPECIFIED TYPE: Primary | ICD-10-CM

## 2024-10-21 NOTE — PROGRESS NOTES
Thyroid levels have improved but your white cells continue to be low. I would like you to see a blood specialist - hematologist. I have placed a referral for Dr. Husain.   [phone]. Please call to schedule an appointment for further work up.  - Dr. Fish

## 2024-10-29 ENCOUNTER — OFFICE VISIT (OUTPATIENT)
Dept: HEMATOLOGY/ONCOLOGY | Facility: HOSPITAL | Age: 59
End: 2024-10-29
Attending: STUDENT IN AN ORGANIZED HEALTH CARE EDUCATION/TRAINING PROGRAM
Payer: COMMERCIAL

## 2024-10-29 VITALS
WEIGHT: 127 LBS | BODY MASS INDEX: 19.93 KG/M2 | TEMPERATURE: 98 F | OXYGEN SATURATION: 100 % | DIASTOLIC BLOOD PRESSURE: 73 MMHG | HEART RATE: 58 BPM | SYSTOLIC BLOOD PRESSURE: 125 MMHG | HEIGHT: 67 IN | RESPIRATION RATE: 16 BRPM

## 2024-10-29 DIAGNOSIS — D72.810 LYMPHOCYTOPENIA: ICD-10-CM

## 2024-10-29 DIAGNOSIS — D70.9 NEUTROPENIA, UNSPECIFIED TYPE (HCC): ICD-10-CM

## 2024-10-29 DIAGNOSIS — D72.819 LEUKOPENIA, UNSPECIFIED TYPE: Primary | ICD-10-CM

## 2024-10-29 PROCEDURE — 99204 OFFICE O/P NEW MOD 45 MIN: CPT | Performed by: STUDENT IN AN ORGANIZED HEALTH CARE EDUCATION/TRAINING PROGRAM

## 2024-10-29 NOTE — PROGRESS NOTES
Hematology/Oncology Initial Consultation Note    Patient Name: Zoya Lockhart  Medical Record Number: [id number]    YOB: 1965   Date of Consultation: 10/29/2024   PCP: Guadalupe Fish MD   Other providers:      Reason for Consultation:  Zoya Lockhart was seen today for the diagnosis of Mild leukopenia         ===================================================  History of Present Illness:      With hypothyroidism presents to the clinic to establish care for mild neutropenia and lymphocytopenia.  Patient has been noted to have longstanding leukopenia however recently was noted to have to have left mild neutropenia and lymphocytopenia.    Denies having any new antibiotics or medication changes.  Denies any recent infections.  Content benign.  Denies having any constitutional symptoms.  She has a balanced diet.  Denies excessive alcohol intake.  No history of having neutropenia or bone marrow failure syndrome.  History of having any aphthous ulcers.  No history of having any bariatric surgery.  No history of recurrent infections. No autoimmune conditions.  No liver disease.    Past Medical History:  Past Medical History:    Back problem    lumbar stenosis    Disorder of thyroid    hypothyroidism    Osteoarthritis       Past Surgical History:   Procedure Laterality Date    Hysterectomy      Oophorectomy Bilateral 2013    preventive surgery d/t mother's ovarian cancer    Tonsillectomy      age 3       Home Medications:   levothyroxine 112 MCG Oral Tab Take 1 tablet (112 mcg total) by mouth before breakfast. 90 tablet 4    Multiple Vitamins-Minerals (CENTRUM SILVER) Oral Tab Take 1 tablet by mouth daily.      Calcium Carbonate-Vitamin D (CALCIUM + D OR) Take 1 tablet by mouth daily.       -------  Medications Ordered Prior to Encounter[1]    Allergies:   Allergies[2]    Psychosocial History:  Social History     Social History Narrative    The patient does not use an assistive device..      The  patient does live in a home with stairs.     Social History     Socioeconomic History    Marital status:    Tobacco Use    Smoking status: Never    Smokeless tobacco: Never   Vaping Use    Vaping status: Never Used   Substance and Sexual Activity    Alcohol use: Yes     Alcohol/week: 0.0 standard drinks of alcohol     Comment: very rarely    Drug use: Never   Other Topics Concern    Caffeine Concern Yes     Comment: 2 cup coffee, 1 cola per day    Exercise Yes     Comment: walking 20 min. twice per day   Social History Narrative    The patient does not use an assistive device..      The patient does live in a home with stairs.       Family Medical History:  Family History   Problem Relation Age of Onset    Cancer Mother         ovarian, and lung cancers    Ovarian Cancer Mother 62    Cancer Father         hx of lymphoma    No Known Problems Sister     No Known Problems Son        Review of Systems:  A 10-point ROS was done with pertinent positives and negative per the HPI    Vital Signs:  Height: 170.2 cm (5' 7\") (10/29 1310)  Weight: 57.6 kg (127 lb) (10/29 1310)  BSA (Calculated - sq m): 1.67 sq meters (10/29 1310)  Pulse: 58 (10/29 1310)  BP: 125/73 (10/29 1310)  Temp: 97.6 °F (36.4 °C) (10/29 1310)  Do Not Use - Resp Rate: --  SpO2: 100 % (10/29 1310)    Wt Readings from Last 6 Encounters:   10/29/24 57.6 kg (127 lb)   07/09/24 56.3 kg (124 lb 3.2 oz)   01/21/23 59.5 kg (131 lb 3.2 oz)   08/19/21 58.7 kg (129 lb 6.4 oz)   05/20/20 60.8 kg (134 lb)   12/04/19 60.1 kg (132 lb 8 oz)       ECOG PS: 0    Physical Examination:  General: Patient is alert and oriented, not in acute distress  Psych: Mood and affect are appropriate  Eyes: EOMI, PERRL  ENT: Oropharynx is clear, no adenopathy  CV: Regular rate and rhythm, normal S1S2, no murmurs, no LE edema  Respiratory: Lungs clear to auscultation bilaterally  GI/Abd: Soft, non-tender with normoactive bowel sounds, no hepatosplenomegaly  Neurological: Grossly  intact   Lymphatics: No palpable cervical, supraclavicular, axillary, or inguinal lymphadenopathy  Skin: no rashes or petechiae      Laboratory:  Lab Results   Component Value Date    WBC 2.7 (L) 10/12/2024    WBC 3.1 (L) 06/08/2024    WBC 3.6 (L) 01/21/2023    HGB 12.9 10/12/2024    HGB 13.6 06/08/2024    HGB 13.1 01/21/2023    HCT 38.2 10/12/2024    MCV 94.1 10/12/2024    MCH 31.8 10/12/2024    MCHC 33.8 10/12/2024    RDW 12.4 10/12/2024    .0 10/12/2024    .0 06/08/2024    .0 01/21/2023     Lab Results   Component Value Date    GLU 91 06/08/2024    BUN 10 06/08/2024    BUNCREA 13.0 06/08/2024    CREATSERUM 0.77 06/08/2024    CREATSERUM 0.76 01/21/2023    CREATSERUM 0.74 08/28/2021    ANIONGAP 6 06/08/2024    GFRNAA 90 08/28/2021    GFRAA 103 08/28/2021    CA 9.7 06/08/2024    OSMOCALC 289 06/08/2024    ALKPHO 80 06/08/2024    AST 27 06/08/2024    ALT 11 06/08/2024    BILT 0.6 06/08/2024    TP 7.6 06/08/2024    ALB 4.4 06/08/2024    GLOBULIN 3.2 06/08/2024     06/08/2024    K 4.2 06/08/2024     06/08/2024    CO2 27.0 06/08/2024     No results found for: \"PTT\", \"PT\", \"INR\"    Imaging:    NA    Impression & Plan:     59 year old female with mild leukopenia.       Leukopenia.   We reviewed the available records and discussed common causes of leukopenia and the initial workup. Common causes include but are not limited to infection, nutritional deficiencies (B12/folate, copper), alcohol abuse, familial disorders, autoimmune illnesses, medications, and more sinister causes such as bone marrow failure or malignancy.     History and renal good health, no concern for having any nutritional deficiencies.  She takes a multivitamin every day.      Given the long-term persistence of the CBC which is relatively stable, we will proceed with repeating the CBC in 3 months.      -pt is asymptomatic without concerns signs or symptoms which is reassuring  -no clinical hx significant for a  rheumatologic disorder so will hold off on screening test    She continues to be persistently neutropenic or worsening we will proceed with additional work up including  LDH, peripheral blood smear, HIV, EBV, Hepatitis screen,ESR/CRP, B12/folate, copper/zinc and abdominal US    Follow up in 3 months with repeat CBC    Kena Husain MD  Hematology/Medical Oncology             [1]   Current Outpatient Medications on File Prior to Visit   Medication Sig Dispense Refill    levothyroxine 112 MCG Oral Tab Take 1 tablet (112 mcg total) by mouth before breakfast. 90 tablet 4    Multiple Vitamins-Minerals (CENTRUM SILVER) Oral Tab Take 1 tablet by mouth daily.      Calcium Carbonate-Vitamin D (CALCIUM + D OR) Take 1 tablet by mouth daily.       No current facility-administered medications on file prior to visit.   [2] No Known Allergies

## 2025-02-15 ENCOUNTER — HOSPITAL ENCOUNTER (OUTPATIENT)
Dept: CT IMAGING | Facility: HOSPITAL | Age: 60
Discharge: HOME | End: 2025-02-15
Attending: FAMILY MEDICINE

## 2025-02-15 DIAGNOSIS — E78.5 HYPERLIPIDEMIA, UNSPECIFIED HYPERLIPIDEMIA TYPE: ICD-10-CM

## 2025-02-24 NOTE — PROGRESS NOTES
Eleno Kenny - Very minimum plaque on one of your heart arteries. Will review at your next physical and repeat cholesterol to discuss plan. Continue healthy eating and regular exercise. - Dr. Fish

## (undated) NOTE — MR AVS SNAPSHOT
[address]awn Salda[address]  [employer]  [phone]               Thank you for choosing us for your health care visit with Radha Chan PT.   We are glad to serve you and happy to provide you [address] [address]   [phone]           Please arrive at your scheduled appointment time. Wear comfortable, loose fitting clothing.             Feb 23, 2017 11:00 AM   [place] [facility] Visit By Therapist with JEY Jangmhurst

## (undated) NOTE — MR AVS SNAPSHOT
Shawn Saldaña [address]  [phone]               Thank you for choosing us for your health care visit with Gareth Polo PT.   We are glad to serve you and happy to provide you with Please arrive at your scheduled appointment time. Wear comfortable, loose fitting clothing.             Feb 14, 2017  3:00 PM   Exam - New Patient with MD LIZA Babcock, [facility]

## (undated) NOTE — IP AVS SNAPSHOT
Patient Demographics     Address  [address] [place] [address] Road [address] Phone  [phone] [facility])  [phone] [facility] E-mail Address  [email or web address]      Emergency Contact(s)     Name Relation Home Work Lindsey Lou [phone] [id number] Potassium Chloride ER (K-DUR M20) CR tab 40 mEq 12/05/19 1634 Given      [id number] acetaminophen (TYLENOL) tab 650 mg (Or Linked Group #1) 12/05/19 1634 Given      [id number] acetaminophen (TYLENOL) tab 650 mg 12/05/19 2040 Given      [id number] a Potassium 3.8 3.5 - 5.1 mmol/L [employer]            Testing Performed By     Lab - Abbreviation Name Director Address Valid Date Range    [address] Lab [facility] Vladislav Turner. Eri Bellamy M.D. [facility] 78  [facility] [address] 04/29/14 1047 - Pre ovarian, and lung cancers      reports that she has never smoked. She has never used smokeless tobacco. She reports that she does not drink alcohol or use drugs. Allergies:  No Known Allergies    Home Medications:  (Not in a hospital admission)[TRISHD. Lumbar stenosis     Primary osteoarthritis of both hips     Idiopathic scoliosis of thoracolumbar spine     Bimalleolar fracture of left ankle, closed, initial encounter[ZD.1]    1. Bimalleolar fracture of left ankle  2. Hypothyroid[ZD. 3]    Plan:[ZD.1] No notes of this type exist for this encounter. SLP Notes    No notes of this type exist for this encounter.      Multidisciplinary Problems     Active Goals        Problem: Patient/Family Goals    Goal Priority Disciplines Outcome Interventions   Kemar

## (undated) NOTE — MR AVS SNAPSHOT
Shawn Piper 12 [address] [employer]  [phone]               Thank you for choosing us for your health care visit with Leola Ayala PT.   We are glad to serve you and happy to provide you

## (undated) NOTE — ED AVS SNAPSHOT
[facility] in [facility] 18.  [address] [facility]    Phone:  [phone]    Fax:  [phone]           Jaden Javier   MRN: [id number]    Department:  [facility] in [address] [place] [address]   Date of Visit:  1 go to the Emergency Department persistent or worsening symptoms, any weakness, numbness or tingling. Dallin Marion       Discharge References/Attachments     BACK PAIN (ACUTE OR CHRONIC) (ENGLISH)      Disclosure     Insurance plans vary and the physician(s) referred by Registration line at [phone] or find a doctor online by visiting [email or web address].    IF THERE IS ANY CHANGE OR WORSENING OF YOUR CONDITION, CALL YOUR PRIMARY CARE PHYSICIAN AT ONCE OR GO TO [address] [place] [address].     If you have been prescribed a - If you have concerns related to behavioral health issues or thoughts of harming yourself, contact [address] [place] [address] at [phone].     - If you don’t have insurance, Grady Rothman has partnered with Patient [employer]

## (undated) NOTE — MR AVS SNAPSHOT
Shawn Saldaañ [address]  [employer]  [phone]               Thank you for choosing us for your health care visit with Dayna Almeida PT.   We are glad to serve you and happy to provide you Feb 21, 2017  8:15 AM   [place] [facility] Visit By Therapist with Joaquim Gutierrez, [address] [facility] ([facility] [facility] [address])    [address]   [phone]           Please arrive

## (undated) NOTE — MR AVS SNAPSHOT
Shawn Saldaña 12 [address] [employer]  [phone]               Thank you for choosing us for your health care visit with Azeb Laguna PT.   We are glad to serve you and happy to provide you Feb 16, 2017  1:15 PM   [place] [facility] Visit By Therapist with Radha Chan, [address] [facility] ([address])    [address]   [phone]           Please arrive

## (undated) NOTE — Clinical Note
[facility] SURGERY SCHEDULING FORM   [address]  [address] [facility]   [phone] (scheduling phone) [phone] (scheduling fax)     PATIENT INFORMATION   Patient Name:    Edmar Bailey   :    1965   Rohan Completed by:    Mary Gonzales      Date:    2/21/2017    [facility] will follow its Pre-Admission Assessment and Screening Policy for pre-admission testing.   Physicians that require   additional testing must order this directly and have results faxed to E

## (undated) NOTE — MR AVS SNAPSHOT
Shawn Piper [address]  [employer]  [phone]               Thank you for choosing us for your health care visit with Luli West PT.   We are glad to serve you and happy to provide you [facility] [address]   [phone]           Please arrive at your scheduled appointment time. Wear comfortable, loose fitting clothing.             Mar 03, 2017  1:15 PM   Injection with Kary Treadwell MD   [facility])    120

## (undated) NOTE — MR AVS SNAPSHOT
[facility]  [address], [address] [facility]  [address] [facility] [phone]               Thank you for choosing us for your health care visit with Saray Francois MD.  We are glad to serve you and happy to provide you with this summary of your · We can no longer fax or phone in these prescriptions to your pharmacy. · These prescriptions will have to be printed out by your physician, signed by the physician, picked up in our office and physically brought to the pharmacy.   · You will need to info Please arrive at your scheduled appointment time. Wear comfortable, loose fitting clothing.             Feb 16, 2017  1:15 PM   [place] [facility] Visit By Therapist with Karthik Gautam, [address] [facility] (Nadja Barcenas Take 1 tablet by mouth daily. CENTRUM SILVER Tabs   Take 1 tablet by mouth daily. gabapentin 100 MG Caps   Take 1 capsule (100 mg total) by mouth 3 (three) times daily.    Commonly known as:  NEURONTIN           Levothyroxine Sodium 125 authorization, such as [place], please feel free to schedule your appointment immediately. However, if you are unsure about the requirements for authorization, please wait 5-7 days and then contact your physician's office.  At that time, you will Enter your [employer] Activation Code exactly as it appears below along with your Zip Code and Date of Birth to complete the sign-up process. If you do not sign up before the expiration date, you must request a new code.     Your unique [employer] Access Code: [email or web address]

## (undated) NOTE — Clinical Note
Dear Dr. Gabriel Hunt    This letter is to inform you that Asif Espinoza has been attending Physical Therapy with me. See below for my most recent plan of care.     Patient Name: Asif Espinoza, : 1965, MRN: [id number]   Date:  3/29/2017  Referr

## (undated) NOTE — MR AVS SNAPSHOT
Shawn Saldaña 12 [address]  [phone]  [phone]               Thank you for choosing us for your health care visit with Leigha Momin PT.   We are glad to serve you and happy to provide you [place] [address]   [phone]           Please arrive at your scheduled appointment time. Wear comfortable, loose fitting clothing.             Feb 28, 2017  8:15 AM   [place] [facility] Therapy Visit By Therapist with JEY Osuna medical emergencies, dial 911.

## (undated) NOTE — MR AVS SNAPSHOT
[facility] [phone]               Thank you for choosing us for your health care visit with Lashawn Curran MD.  We are glad to serve you and happy to provide you with this summary of your visit.   Please h Generic drug:  ibuprofen   Take 200 mg by mouth every 6 (six) hours as needed for Pain. CALCIUM + D OR   Take 1 tablet by mouth daily. CENTRUM SILVER Tabs   Take 1 tablet by mouth daily.            gabapentin 100 MG Caps   Take 1 capsule your Zip Code and Date of Birth to complete the sign-up process. If you do not sign up before the expiration date, you must request a new code.     Your unique [employer] Access Code: [email or web address]  Expires: 3/4/2017  6:00 PM    If you have questions, you can ca

## (undated) NOTE — Clinical Note
2/13/2017      Mamadou Sue MD  Physical Medicine and Rehabilitation  [address] [address]  Dept: [phone]  Dept Fax: [phone]        RE: Consultation for Mindy Barbosa        Dear Dr. Sugar Marie,    Thank you v

## (undated) NOTE — MR AVS SNAPSHOT
Shawn Saldaña [address]  [place]  [phone]               Thank you for choosing us for your health care visit with Shalonda Melgar PT.   We are glad to serve you and happy to provide you [place] [address]   [phone]           Please arrive at your scheduled appointment time. Wear comfortable, loose fitting clothing.             Mar 02, 2017  2:45 PM   [place] [facility] Visit By Therapist with JEY Larry medical emergencies, dial 911.

## (undated) NOTE — MR AVS SNAPSHOT
Shawn Saldaña [address]  [phone]               Thank you for choosing us for your health care visit with Clarise Fabry, PT.   We are glad to serve you and happy to provide you loose fitting clothing. Feb 16, 2017  1:15 PM   [place] Physical Therapy Visit By Therapist with Kelly Larios, [address] [facility] ([address] [facility])    [address]  [address]